# Patient Record
Sex: MALE | Race: BLACK OR AFRICAN AMERICAN | NOT HISPANIC OR LATINO | Employment: PART TIME | URBAN - METROPOLITAN AREA
[De-identification: names, ages, dates, MRNs, and addresses within clinical notes are randomized per-mention and may not be internally consistent; named-entity substitution may affect disease eponyms.]

---
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
## 2021-02-20 ENCOUNTER — HOSPITAL ENCOUNTER (EMERGENCY)
Facility: HOSPITAL | Age: 21
Discharge: HOME | End: 2021-02-20
Attending: INTERNAL MEDICINE
Payer: MEDICAID

## 2021-02-20 VITALS
SYSTOLIC BLOOD PRESSURE: 139 MMHG | OXYGEN SATURATION: 100 % | RESPIRATION RATE: 18 BRPM | WEIGHT: 178.19 LBS | BODY MASS INDEX: 22.87 KG/M2 | DIASTOLIC BLOOD PRESSURE: 74 MMHG | HEART RATE: 92 BPM | HEIGHT: 74 IN | TEMPERATURE: 99 F

## 2021-02-20 DIAGNOSIS — J06.9 VIRAL URI WITH COUGH: ICD-10-CM

## 2021-02-20 DIAGNOSIS — J20.9 ACUTE BRONCHITIS, UNSPECIFIED ORGANISM: Primary | ICD-10-CM

## 2021-02-20 DIAGNOSIS — Z77.22 EXPOSURE TO SECOND HAND SMOKE: ICD-10-CM

## 2021-02-20 LAB
CTP QC/QA: YES
SARS-COV-2 RDRP RESP QL NAA+PROBE: NEGATIVE

## 2021-02-20 PROCEDURE — U0002 COVID-19 LAB TEST NON-CDC: HCPCS | Mod: ER | Performed by: NURSE PRACTITIONER

## 2021-02-20 PROCEDURE — 99284 EMERGENCY DEPT VISIT MOD MDM: CPT | Mod: 25,ER

## 2021-02-20 RX ORDER — FLUTICASONE PROPIONATE 50 MCG
1 SPRAY, SUSPENSION (ML) NASAL 2 TIMES DAILY PRN
Qty: 15 G | Refills: 0 | OUTPATIENT
Start: 2021-02-20 | End: 2021-06-17

## 2021-02-20 RX ORDER — BENZONATATE 100 MG/1
100 CAPSULE ORAL 3 TIMES DAILY PRN
Qty: 30 CAPSULE | Refills: 0 | Status: SHIPPED | OUTPATIENT
Start: 2021-02-20 | End: 2021-03-02

## 2021-02-20 RX ORDER — ALBUTEROL SULFATE 5 MG/ML
2.5 SOLUTION RESPIRATORY (INHALATION)
COMMUNITY
End: 2021-11-16 | Stop reason: SDUPTHER

## 2021-02-20 RX ORDER — PROMETHAZINE HYDROCHLORIDE AND DEXTROMETHORPHAN HYDROBROMIDE 6.25; 15 MG/5ML; MG/5ML
5 SYRUP ORAL NIGHTLY PRN
Qty: 180 ML | Refills: 0 | Status: SHIPPED | OUTPATIENT
Start: 2021-02-20 | End: 2021-03-02

## 2021-02-20 RX ORDER — AZITHROMYCIN 250 MG/1
250 TABLET, FILM COATED ORAL DAILY
Qty: 6 TABLET | Refills: 0 | OUTPATIENT
Start: 2021-02-20 | End: 2021-06-17

## 2021-02-20 RX ORDER — CETIRIZINE HYDROCHLORIDE 10 MG/1
10 TABLET ORAL DAILY
Qty: 30 TABLET | Refills: 0 | OUTPATIENT
Start: 2021-02-20 | End: 2021-06-17

## 2021-06-17 ENCOUNTER — HOSPITAL ENCOUNTER (EMERGENCY)
Facility: HOSPITAL | Age: 21
Discharge: HOME | End: 2021-06-17
Attending: EMERGENCY MEDICINE
Payer: MEDICAID

## 2021-06-17 VITALS
WEIGHT: 185 LBS | DIASTOLIC BLOOD PRESSURE: 70 MMHG | OXYGEN SATURATION: 98 % | HEIGHT: 74 IN | SYSTOLIC BLOOD PRESSURE: 124 MMHG | TEMPERATURE: 99 F | RESPIRATION RATE: 18 BRPM | BODY MASS INDEX: 23.74 KG/M2 | HEART RATE: 89 BPM

## 2021-06-17 DIAGNOSIS — H66.91 RIGHT OTITIS MEDIA, UNSPECIFIED OTITIS MEDIA TYPE: Primary | ICD-10-CM

## 2021-06-17 DIAGNOSIS — J06.9 VIRAL URI WITH COUGH: ICD-10-CM

## 2021-06-17 DIAGNOSIS — J30.9 ALLERGIC RHINITIS, UNSPECIFIED SEASONALITY, UNSPECIFIED TRIGGER: ICD-10-CM

## 2021-06-17 LAB
CTP QC/QA: YES
INFLUENZA A ANTIGEN, POC: NEGATIVE
INFLUENZA B ANTIGEN, POC: NEGATIVE
SARS-COV-2 RDRP RESP QL NAA+PROBE: NEGATIVE

## 2021-06-17 PROCEDURE — 25000003 PHARM REV CODE 250: Mod: ER | Performed by: NURSE PRACTITIONER

## 2021-06-17 PROCEDURE — U0002 COVID-19 LAB TEST NON-CDC: HCPCS | Mod: ER | Performed by: NURSE PRACTITIONER

## 2021-06-17 PROCEDURE — 87804 INFLUENZA ASSAY W/OPTIC: CPT | Mod: 59,ER

## 2021-06-17 PROCEDURE — 99284 EMERGENCY DEPT VISIT MOD MDM: CPT | Mod: 25,ER

## 2021-06-17 RX ORDER — DEXTROMETHORPHAN HYDROBROMIDE, GUAIFENESIN 5; 100 MG/5ML; MG/5ML
650 LIQUID ORAL EVERY 8 HOURS
Qty: 20 TABLET | Refills: 0 | Status: SHIPPED | OUTPATIENT
Start: 2021-06-17 | End: 2021-07-17

## 2021-06-17 RX ORDER — IBUPROFEN 600 MG/1
600 TABLET ORAL EVERY 6 HOURS PRN
Qty: 20 TABLET | Refills: 0 | Status: SHIPPED | OUTPATIENT
Start: 2021-06-17 | End: 2021-07-17

## 2021-06-17 RX ORDER — ONDANSETRON 4 MG/1
4 TABLET, ORALLY DISINTEGRATING ORAL
Status: COMPLETED | OUTPATIENT
Start: 2021-06-17 | End: 2021-06-17

## 2021-06-17 RX ORDER — AMOXICILLIN AND CLAVULANATE POTASSIUM 875; 125 MG/1; MG/1
1 TABLET, FILM COATED ORAL 2 TIMES DAILY
Qty: 20 TABLET | Refills: 0 | Status: SHIPPED | OUTPATIENT
Start: 2021-06-17 | End: 2021-06-27

## 2021-06-17 RX ORDER — FLUTICASONE PROPIONATE 50 MCG
1 SPRAY, SUSPENSION (ML) NASAL 2 TIMES DAILY PRN
Qty: 15 G | Refills: 0 | OUTPATIENT
Start: 2021-06-17 | End: 2021-07-17

## 2021-06-17 RX ORDER — IBUPROFEN 600 MG/1
600 TABLET ORAL
Status: COMPLETED | OUTPATIENT
Start: 2021-06-17 | End: 2021-06-17

## 2021-06-17 RX ORDER — CETIRIZINE HYDROCHLORIDE 10 MG/1
10 TABLET ORAL DAILY
Qty: 30 TABLET | Refills: 0 | Status: SHIPPED | OUTPATIENT
Start: 2021-06-17 | End: 2021-07-17

## 2021-06-17 RX ADMIN — IBUPROFEN 600 MG: 600 TABLET ORAL at 06:06

## 2021-06-17 RX ADMIN — ONDANSETRON 4 MG: 4 TABLET, ORALLY DISINTEGRATING ORAL at 06:06

## 2021-07-17 ENCOUNTER — HOSPITAL ENCOUNTER (EMERGENCY)
Facility: HOSPITAL | Age: 21
Discharge: HOME | End: 2021-07-17
Attending: INTERNAL MEDICINE
Payer: MEDICAID

## 2021-07-17 VITALS
SYSTOLIC BLOOD PRESSURE: 120 MMHG | WEIGHT: 180 LBS | OXYGEN SATURATION: 96 % | HEART RATE: 88 BPM | HEIGHT: 74 IN | BODY MASS INDEX: 23.1 KG/M2 | RESPIRATION RATE: 21 BRPM | TEMPERATURE: 98 F | DIASTOLIC BLOOD PRESSURE: 78 MMHG

## 2021-07-17 DIAGNOSIS — J45.31 MILD PERSISTENT ASTHMA WITH EXACERBATION: ICD-10-CM

## 2021-07-17 DIAGNOSIS — R05.9 COUGH: ICD-10-CM

## 2021-07-17 DIAGNOSIS — J06.9 ACUTE URI: Primary | ICD-10-CM

## 2021-07-17 LAB
CTP QC/QA: YES
SARS-COV-2 RDRP RESP QL NAA+PROBE: NEGATIVE

## 2021-07-17 PROCEDURE — 94640 AIRWAY INHALATION TREATMENT: CPT | Mod: ER

## 2021-07-17 PROCEDURE — 99284 EMERGENCY DEPT VISIT MOD MDM: CPT | Mod: 25,ER

## 2021-07-17 PROCEDURE — U0002 COVID-19 LAB TEST NON-CDC: HCPCS | Mod: ER | Performed by: PHYSICIAN ASSISTANT

## 2021-07-17 PROCEDURE — 27100098 HC SPACER: Mod: ER

## 2021-07-17 PROCEDURE — 63600175 PHARM REV CODE 636 W HCPCS: Mod: ER | Performed by: INTERNAL MEDICINE

## 2021-07-17 PROCEDURE — 25000242 PHARM REV CODE 250 ALT 637 W/ HCPCS: Mod: ER | Performed by: INTERNAL MEDICINE

## 2021-07-17 RX ORDER — IBUPROFEN 800 MG/1
800 TABLET ORAL EVERY 8 HOURS PRN
Qty: 30 TABLET | Refills: 0 | OUTPATIENT
Start: 2021-07-17 | End: 2023-05-01

## 2021-07-17 RX ORDER — ALBUTEROL SULFATE 2.5 MG/.5ML
SOLUTION RESPIRATORY (INHALATION)
Status: COMPLETED
Start: 2021-07-17 | End: 2021-07-17

## 2021-07-17 RX ORDER — PREDNISONE 20 MG/1
40 TABLET ORAL DAILY
Qty: 10 TABLET | Refills: 0 | Status: SHIPPED | OUTPATIENT
Start: 2021-07-17 | End: 2021-07-22

## 2021-07-17 RX ORDER — FLUTICASONE PROPIONATE 50 MCG
2 SPRAY, SUSPENSION (ML) NASAL DAILY
Qty: 15 G | Refills: 0 | Status: SHIPPED | OUTPATIENT
Start: 2021-07-17 | End: 2022-03-27 | Stop reason: SDUPTHER

## 2021-07-17 RX ORDER — AZELASTINE 1 MG/ML
2 SPRAY, METERED NASAL 2 TIMES DAILY
Qty: 30 ML | Refills: 0 | OUTPATIENT
Start: 2021-07-17 | End: 2023-05-01

## 2021-07-17 RX ORDER — PREDNISONE 20 MG/1
60 TABLET ORAL
Status: COMPLETED | OUTPATIENT
Start: 2021-07-17 | End: 2021-07-17

## 2021-07-17 RX ORDER — LEVETIRACETAM 750 MG/1
750 TABLET, EXTENDED RELEASE ORAL 2 TIMES DAILY
COMMUNITY
Start: 2021-06-16 | End: 2023-09-11 | Stop reason: SDUPTHER

## 2021-07-17 RX ORDER — FLUOXETINE HYDROCHLORIDE 40 MG/1
40 CAPSULE ORAL DAILY
COMMUNITY
Start: 2021-06-17

## 2021-07-17 RX ORDER — ALBUTEROL SULFATE 90 UG/1
2 AEROSOL, METERED RESPIRATORY (INHALATION) EVERY 6 HOURS PRN
Status: DISCONTINUED | OUTPATIENT
Start: 2021-07-18 | End: 2021-07-18 | Stop reason: HOSPADM

## 2021-07-17 RX ADMIN — PREDNISONE 60 MG: 20 TABLET ORAL at 11:07

## 2021-07-17 RX ADMIN — ALBUTEROL SULFATE 2 PUFF: 90 AEROSOL, METERED RESPIRATORY (INHALATION) at 11:07

## 2021-09-25 ENCOUNTER — HOSPITAL ENCOUNTER (EMERGENCY)
Facility: HOSPITAL | Age: 21
Discharge: HOME | End: 2021-09-25
Attending: EMERGENCY MEDICINE
Payer: MEDICAID

## 2021-09-25 VITALS
DIASTOLIC BLOOD PRESSURE: 82 MMHG | HEIGHT: 74 IN | OXYGEN SATURATION: 100 % | WEIGHT: 192 LBS | TEMPERATURE: 98 F | RESPIRATION RATE: 16 BRPM | HEART RATE: 70 BPM | BODY MASS INDEX: 24.64 KG/M2 | SYSTOLIC BLOOD PRESSURE: 134 MMHG

## 2021-09-25 DIAGNOSIS — R06.2 WHEEZING: ICD-10-CM

## 2021-09-25 DIAGNOSIS — R09.81 SINUS CONGESTION: Primary | ICD-10-CM

## 2021-09-25 DIAGNOSIS — J45.21 MILD INTERMITTENT ASTHMATIC BRONCHITIS WITH ACUTE EXACERBATION: ICD-10-CM

## 2021-09-25 LAB
CTP QC/QA: YES
POC RAPID STREP A: NEGATIVE
SARS-COV-2 RDRP RESP QL NAA+PROBE: NEGATIVE

## 2021-09-25 PROCEDURE — 25000242 PHARM REV CODE 250 ALT 637 W/ HCPCS: Mod: ER | Performed by: EMERGENCY MEDICINE

## 2021-09-25 PROCEDURE — 63600175 PHARM REV CODE 636 W HCPCS: Mod: ER | Performed by: EMERGENCY MEDICINE

## 2021-09-25 PROCEDURE — 94640 AIRWAY INHALATION TREATMENT: CPT | Mod: ER

## 2021-09-25 PROCEDURE — U0002 COVID-19 LAB TEST NON-CDC: HCPCS | Mod: ER | Performed by: EMERGENCY MEDICINE

## 2021-09-25 PROCEDURE — 99285 EMERGENCY DEPT VISIT HI MDM: CPT | Mod: 25,ER

## 2021-09-25 RX ORDER — PREDNISONE 20 MG/1
40 TABLET ORAL DAILY
Qty: 10 TABLET | Refills: 0 | Status: SHIPPED | OUTPATIENT
Start: 2021-09-25 | End: 2021-09-30

## 2021-09-25 RX ORDER — ALBUTEROL SULFATE 90 UG/1
1-2 AEROSOL, METERED RESPIRATORY (INHALATION) EVERY 6 HOURS PRN
Qty: 8 G | Refills: 2 | Status: SHIPPED | OUTPATIENT
Start: 2021-09-25 | End: 2021-11-16 | Stop reason: SDUPTHER

## 2021-09-25 RX ORDER — IPRATROPIUM BROMIDE AND ALBUTEROL SULFATE 2.5; .5 MG/3ML; MG/3ML
3 SOLUTION RESPIRATORY (INHALATION)
Status: COMPLETED | OUTPATIENT
Start: 2021-09-25 | End: 2021-09-25

## 2021-09-25 RX ORDER — PREDNISONE 20 MG/1
60 TABLET ORAL
Status: COMPLETED | OUTPATIENT
Start: 2021-09-25 | End: 2021-09-25

## 2021-09-25 RX ORDER — GUAIFENESIN AND PSEUDOEPHEDRINE HCL 1200; 120 MG/1; MG/1
1 TABLET, EXTENDED RELEASE ORAL 2 TIMES DAILY
Qty: 10 EACH | Refills: 1 | Status: SHIPPED | OUTPATIENT
Start: 2021-09-25 | End: 2021-09-30

## 2021-09-25 RX ADMIN — IPRATROPIUM BROMIDE AND ALBUTEROL SULFATE 3 ML: .5; 3 SOLUTION RESPIRATORY (INHALATION) at 08:09

## 2021-09-25 RX ADMIN — PREDNISONE 60 MG: 20 TABLET ORAL at 08:09

## 2021-11-16 ENCOUNTER — OFFICE VISIT (OUTPATIENT)
Dept: URGENT CARE | Facility: CLINIC | Age: 21
End: 2021-11-16
Payer: MEDICAID

## 2021-11-16 VITALS
HEART RATE: 89 BPM | HEIGHT: 74 IN | DIASTOLIC BLOOD PRESSURE: 83 MMHG | BODY MASS INDEX: 24.38 KG/M2 | WEIGHT: 190 LBS | RESPIRATION RATE: 20 BRPM | SYSTOLIC BLOOD PRESSURE: 126 MMHG | TEMPERATURE: 98 F | OXYGEN SATURATION: 93 %

## 2021-11-16 DIAGNOSIS — R09.81 NASAL CONGESTION: Primary | ICD-10-CM

## 2021-11-16 DIAGNOSIS — J45.901 MODERATE ASTHMA WITH EXACERBATION, UNSPECIFIED WHETHER PERSISTENT: ICD-10-CM

## 2021-11-16 LAB
CTP QC/QA: YES
SARS-COV-2 RDRP RESP QL NAA+PROBE: NEGATIVE

## 2021-11-16 PROCEDURE — 71046 XR CHEST PA AND LATERAL: ICD-10-PCS | Mod: S$GLB,,, | Performed by: RADIOLOGY

## 2021-11-16 PROCEDURE — U0002 COVID-19 LAB TEST NON-CDC: HCPCS | Mod: QW,S$GLB,, | Performed by: NURSE PRACTITIONER

## 2021-11-16 PROCEDURE — 94640 AIRWAY INHALATION TREATMENT: CPT | Mod: 59,S$GLB,, | Performed by: NURSE PRACTITIONER

## 2021-11-16 PROCEDURE — 99204 PR OFFICE/OUTPT VISIT, NEW, LEVL IV, 45-59 MIN: ICD-10-PCS | Mod: 25,S$GLB,, | Performed by: NURSE PRACTITIONER

## 2021-11-16 PROCEDURE — 71046 X-RAY EXAM CHEST 2 VIEWS: CPT | Mod: S$GLB,,, | Performed by: RADIOLOGY

## 2021-11-16 PROCEDURE — 99204 OFFICE O/P NEW MOD 45 MIN: CPT | Mod: 25,S$GLB,, | Performed by: NURSE PRACTITIONER

## 2021-11-16 PROCEDURE — U0002: ICD-10-PCS | Mod: QW,S$GLB,, | Performed by: NURSE PRACTITIONER

## 2021-11-16 PROCEDURE — 94640 PR INHAL RX, AIRWAY OBST/DX SPUTUM INDUCT: ICD-10-PCS | Mod: S$GLB,,, | Performed by: NURSE PRACTITIONER

## 2021-11-16 RX ORDER — ALBUTEROL SULFATE 5 MG/ML
2.5 SOLUTION RESPIRATORY (INHALATION) EVERY 6 HOURS PRN
Qty: 20 ML | Refills: 1 | Status: SHIPPED | OUTPATIENT
Start: 2021-11-16 | End: 2022-01-31 | Stop reason: SDUPTHER

## 2021-11-16 RX ORDER — CETIRIZINE HYDROCHLORIDE 10 MG/1
10 TABLET ORAL DAILY
Qty: 30 TABLET | Refills: 0 | OUTPATIENT
Start: 2021-11-16 | End: 2023-05-01

## 2021-11-16 RX ORDER — ALBUTEROL SULFATE 0.83 MG/ML
2.5 SOLUTION RESPIRATORY (INHALATION)
Status: COMPLETED | OUTPATIENT
Start: 2021-11-16 | End: 2021-11-16

## 2021-11-16 RX ORDER — IPRATROPIUM BROMIDE 0.5 MG/2.5ML
0.5 SOLUTION RESPIRATORY (INHALATION)
Status: COMPLETED | OUTPATIENT
Start: 2021-11-16 | End: 2021-11-16

## 2021-11-16 RX ORDER — FLUTICASONE PROPIONATE 50 MCG
1 SPRAY, SUSPENSION (ML) NASAL 2 TIMES DAILY
Qty: 9.9 ML | Refills: 0 | OUTPATIENT
Start: 2021-11-16 | End: 2023-05-01

## 2021-11-16 RX ORDER — ALBUTEROL SULFATE 90 UG/1
1-2 AEROSOL, METERED RESPIRATORY (INHALATION) EVERY 6 HOURS PRN
Qty: 8 G | Refills: 2 | Status: SHIPPED | OUTPATIENT
Start: 2021-11-16 | End: 2021-12-16

## 2021-11-16 RX ADMIN — IPRATROPIUM BROMIDE 0.5 MG: 0.5 SOLUTION RESPIRATORY (INHALATION) at 12:11

## 2021-11-16 RX ADMIN — ALBUTEROL SULFATE 2.5 MG: 0.83 SOLUTION RESPIRATORY (INHALATION) at 12:11

## 2022-01-12 ENCOUNTER — HOSPITAL ENCOUNTER (EMERGENCY)
Facility: HOSPITAL | Age: 22
Discharge: HOME | End: 2022-01-12
Attending: EMERGENCY MEDICINE
Payer: MEDICAID

## 2022-01-12 VITALS
SYSTOLIC BLOOD PRESSURE: 123 MMHG | OXYGEN SATURATION: 100 % | HEIGHT: 74 IN | HEART RATE: 116 BPM | BODY MASS INDEX: 23.1 KG/M2 | DIASTOLIC BLOOD PRESSURE: 84 MMHG | RESPIRATION RATE: 18 BRPM | TEMPERATURE: 100 F | WEIGHT: 180 LBS

## 2022-01-12 DIAGNOSIS — J45.20 MILD INTERMITTENT ASTHMA, UNSPECIFIED WHETHER COMPLICATED: ICD-10-CM

## 2022-01-12 DIAGNOSIS — U07.1 COVID-19: Primary | ICD-10-CM

## 2022-01-12 LAB
CTP QC/QA: YES
SARS-COV-2 RDRP RESP QL NAA+PROBE: POSITIVE

## 2022-01-12 PROCEDURE — 63600175 PHARM REV CODE 636 W HCPCS: Mod: ER | Performed by: EMERGENCY MEDICINE

## 2022-01-12 PROCEDURE — 99284 EMERGENCY DEPT VISIT MOD MDM: CPT | Mod: 25,ER

## 2022-01-12 PROCEDURE — 25000242 PHARM REV CODE 250 ALT 637 W/ HCPCS: Mod: ER | Performed by: EMERGENCY MEDICINE

## 2022-01-12 PROCEDURE — U0002 COVID-19 LAB TEST NON-CDC: HCPCS | Mod: ER | Performed by: EMERGENCY MEDICINE

## 2022-01-12 RX ORDER — IPRATROPIUM BROMIDE AND ALBUTEROL SULFATE 2.5; .5 MG/3ML; MG/3ML
3 SOLUTION RESPIRATORY (INHALATION)
Status: COMPLETED | OUTPATIENT
Start: 2022-01-12 | End: 2022-01-12

## 2022-01-12 RX ORDER — ALBUTEROL SULFATE 2.5 MG/.5ML
2.5 SOLUTION RESPIRATORY (INHALATION) EVERY 4 HOURS PRN
Qty: 30 EACH | Refills: 0 | OUTPATIENT
Start: 2022-01-12 | End: 2022-01-31

## 2022-01-12 RX ORDER — PREDNISONE 10 MG/1
10 TABLET ORAL DAILY
Qty: 5 TABLET | Refills: 0 | Status: SHIPPED | OUTPATIENT
Start: 2022-01-12 | End: 2022-01-17

## 2022-01-12 RX ORDER — PREDNISONE 20 MG/1
40 TABLET ORAL
Status: COMPLETED | OUTPATIENT
Start: 2022-01-12 | End: 2022-01-12

## 2022-01-12 RX ORDER — METOCLOPRAMIDE 10 MG/1
10 TABLET ORAL EVERY 6 HOURS PRN
Qty: 30 TABLET | Refills: 0 | OUTPATIENT
Start: 2022-01-12 | End: 2023-05-01

## 2022-01-12 RX ORDER — ALBUTEROL SULFATE 90 UG/1
AEROSOL, METERED RESPIRATORY (INHALATION)
Status: DISCONTINUED
Start: 2022-01-12 | End: 2022-01-12 | Stop reason: HOSPADM

## 2022-01-12 RX ADMIN — PREDNISONE 40 MG: 20 TABLET ORAL at 09:01

## 2022-01-12 RX ADMIN — IPRATROPIUM BROMIDE AND ALBUTEROL SULFATE 3 ML: .5; 3 SOLUTION RESPIRATORY (INHALATION) at 09:01

## 2022-01-13 NOTE — ED PROVIDER NOTES
Encounter Date: 1/12/2022    SCRIBE #1 NOTE: I, Sharona Simpson, am scribing for, and in the presence of,  Jace Carranza MD. I have scribed the following portions of the note - Other sections scribed: HPI, ROS, PE.       History     Chief Complaint   Patient presents with    COVID-19 Concerns     Complains of headache, SOB, nausea, vomiting, and fatigue since this morning.      Raymond Duarte is a 21 y.o. male with Hx of Asthma who presents to the ED for evaluation of acute shortness of breath, abdominal pain, nausea, vomiting, and dizziness since this morning. Denies any other associated symptoms. No exacerbating or alleviating factors stated. No other complaints at this time.    The history is provided by the patient. No  was used.     Review of patient's allergies indicates:  No Known Allergies  Past Medical History:   Diagnosis Date    Asthma     Seizures      No past surgical history on file.  No family history on file.  Social History     Tobacco Use    Smoking status: Never Smoker    Smokeless tobacco: Never Used   Substance Use Topics    Alcohol use: Never    Drug use: Never     Review of Systems   Constitutional: Negative.  Negative for fever.   HENT: Negative.  Negative for sore throat.    Eyes: Negative.    Respiratory: Positive for shortness of breath.    Cardiovascular: Negative.  Negative for chest pain.   Gastrointestinal: Positive for abdominal pain, nausea and vomiting.   Endocrine: Negative.    Genitourinary: Negative.  Negative for dysuria.   Musculoskeletal: Negative.  Negative for myalgias.   Skin: Negative for rash.   Allergic/Immunologic: Negative.    Neurological: Positive for dizziness. Negative for headaches.   Hematological: Negative.  Negative for adenopathy.   Psychiatric/Behavioral: Negative.  Negative for behavioral problems.   All other systems reviewed and are negative.      Physical Exam     Initial Vitals [01/12/22 1756]   BP Pulse Resp Temp SpO2    (!) 137/51 110 20 99.9 °F (37.7 °C) 97 %      MAP       --         Physical Exam    Nursing note and vitals reviewed.  Constitutional: He appears well-developed and well-nourished.   HENT:   Head: Normocephalic and atraumatic.   Eyes: Conjunctivae and EOM are normal.   Neck: Neck supple.   Normal range of motion.  Cardiovascular: Normal rate and intact distal pulses.   Pulmonary/Chest: Effort normal. No respiratory distress. He has wheezes in the right upper field, the right middle field, the right lower field, the left upper field, the left middle field and the left lower field.   Abdominal: Abdomen is soft. There is no abdominal tenderness.   Musculoskeletal:         General: Normal range of motion.      Cervical back: Normal range of motion and neck supple.     Neurological: He is alert and oriented to person, place, and time.   Skin: Skin is warm and dry.   Psychiatric: He has a normal mood and affect. His behavior is normal.         ED Course   Procedures  Labs Reviewed   SARS-COV-2 RDRP GENE - Abnormal; Notable for the following components:       Result Value    POC Rapid COVID Positive (*)     All other components within normal limits    Narrative:     This test utilizes isothermal nucleic acid amplification   technology to detect the SARS-CoV-2 RdRp nucleic acid segment.   The analytical sensitivity (limit of detection) is 125 genome   equivalents/mL.   A POSITIVE result implies infection with the SARS-CoV-2 virus;   the patient is presumed to be contagious.     A NEGATIVE result means that SARS-CoV-2 nucleic acids are not   present above the limit of detection. A NEGATIVE result should be   treated as presumptive. It does not rule out the possibility of   COVID-19 and should not be the sole basis for treatment decisions.   If COVID-19 is strongly suspected based on clinical and exposure   history, re-testing using an alternate molecular assay should be   considered.   This test is only for use under the  Food and Drug   Administration s Emergency Use Authorization (EUA).   Commercial kits are provided by [employer].   Performance characteristics of the EUA have been independently   verified by [facility] Department of   Pathology and Laboratory Medicine.   _________________________________________________________________   The authorized Fact Sheet for Healthcare Providers and the authorized Fact   Sheet for Patients of the ID NOW COVID-19 are available on the FDA   website:     https://www.fda.gov/media/615282/download  https://www.fda.gov/media/877052/download                Imaging Results    None          Medications   albuterol-ipratropium 2.5 mg-0.5 mg/3 mL nebulizer solution 3 mL (3 mLs Nebulization Given 1/12/22 2105)   predniSONE tablet 40 mg (40 mg Oral Given 1/12/22 2115)     Medical Decision Making:   History:   Old Medical Records: I decided to obtain old medical records.  ED Management:  Markedly improved with steroid therapy and nebulizer treatment.          Scribe Attestation:   Scribe #1: I performed the above scribed service and the documentation accurately describes the services I performed. I attest to the accuracy of the note.               This document was produced by a scribe under my direction and in my presence. I agree with the content of the note and have made any necessary edits.     Jace Carranza MD    01/13/2022 1:23 AM    Clinical Impression:   Final diagnoses:  [U07.1] COVID-19 (Primary)  [J45.20] Mild intermittent asthma, unspecified whether complicated          ED Disposition Condition    Discharge Stable        ED Prescriptions     Medication Sig Dispense Start Date End Date Auth. Provider    albuterol sulfate 2.5 mg/0.5 mL Nebu Take 2.5 mg by nebulization every 4 (four) hours as needed. Rescue 30 each 1/12/2022 1/12/2023 Jace Carranza MD    predniSONE (DELTASONE) 10 MG tablet Take 1 tablet (10 mg total) by mouth once daily. for 5 days 5 tablet  1/12/2022 1/17/2022 Jace Carranza MD    metoclopramide HCl (REGLAN) 10 MG tablet Take 1 tablet (10 mg total) by mouth every 6 (six) hours as needed. 30 tablet 1/12/2022  Jace Carranza MD        Follow-up Information     Follow up With Specialties Details Why Contact Info    Your PCP  Schedule an appointment as soon as possible for a visit in 1 week As needed            Jace Carranza MD  01/13/22 0124

## 2022-01-31 ENCOUNTER — HOSPITAL ENCOUNTER (EMERGENCY)
Facility: HOSPITAL | Age: 22
Discharge: HOME | End: 2022-01-31
Attending: EMERGENCY MEDICINE
Payer: MEDICAID

## 2022-01-31 VITALS
RESPIRATION RATE: 18 BRPM | TEMPERATURE: 99 F | HEIGHT: 74 IN | WEIGHT: 180 LBS | SYSTOLIC BLOOD PRESSURE: 130 MMHG | BODY MASS INDEX: 23.1 KG/M2 | DIASTOLIC BLOOD PRESSURE: 81 MMHG | OXYGEN SATURATION: 98 % | HEART RATE: 87 BPM

## 2022-01-31 DIAGNOSIS — J45.901 MODERATE ASTHMA WITH EXACERBATION, UNSPECIFIED WHETHER PERSISTENT: ICD-10-CM

## 2022-01-31 DIAGNOSIS — J45.901 EXACERBATION OF ASTHMA, UNSPECIFIED ASTHMA SEVERITY, UNSPECIFIED WHETHER PERSISTENT: Primary | ICD-10-CM

## 2022-01-31 PROCEDURE — 94644 CONT INHLJ TX 1ST HOUR: CPT | Mod: ER

## 2022-01-31 PROCEDURE — 99284 EMERGENCY DEPT VISIT MOD MDM: CPT | Mod: 25,ER

## 2022-01-31 PROCEDURE — 25000242 PHARM REV CODE 250 ALT 637 W/ HCPCS: Mod: ER | Performed by: EMERGENCY MEDICINE

## 2022-01-31 PROCEDURE — 63600175 PHARM REV CODE 636 W HCPCS: Mod: ER | Performed by: EMERGENCY MEDICINE

## 2022-01-31 PROCEDURE — 96372 THER/PROPH/DIAG INJ SC/IM: CPT | Mod: ER

## 2022-01-31 RX ORDER — ALBUTEROL SULFATE 5 MG/ML
2.5 SOLUTION RESPIRATORY (INHALATION) EVERY 4 HOURS PRN
Qty: 20 ML | Refills: 3 | Status: SHIPPED | OUTPATIENT
Start: 2022-01-31 | End: 2022-03-27 | Stop reason: SDUPTHER

## 2022-01-31 RX ORDER — ALBUTEROL SULFATE 90 UG/1
1-2 AEROSOL, METERED RESPIRATORY (INHALATION) EVERY 6 HOURS PRN
Qty: 18 G | Refills: 2 | Status: SHIPPED | OUTPATIENT
Start: 2022-01-31 | End: 2022-03-27 | Stop reason: SDUPTHER

## 2022-01-31 RX ORDER — IPRATROPIUM BROMIDE AND ALBUTEROL SULFATE 2.5; .5 MG/3ML; MG/3ML
9 SOLUTION RESPIRATORY (INHALATION) CONTINUOUS
Status: DISPENSED | OUTPATIENT
Start: 2022-01-31 | End: 2022-01-31

## 2022-01-31 RX ORDER — PREDNISONE 20 MG/1
40 TABLET ORAL DAILY
Qty: 10 TABLET | Refills: 0 | Status: SHIPPED | OUTPATIENT
Start: 2022-01-31 | End: 2022-02-05

## 2022-01-31 RX ORDER — DEXAMETHASONE SODIUM PHOSPHATE 4 MG/ML
6 INJECTION, SOLUTION INTRA-ARTICULAR; INTRALESIONAL; INTRAMUSCULAR; INTRAVENOUS; SOFT TISSUE
Status: COMPLETED | OUTPATIENT
Start: 2022-01-31 | End: 2022-01-31

## 2022-01-31 RX ADMIN — DEXAMETHASONE SODIUM PHOSPHATE 6 MG: 4 INJECTION INTRA-ARTICULAR; INTRALESIONAL; INTRAMUSCULAR; INTRAVENOUS; SOFT TISSUE at 07:01

## 2022-01-31 RX ADMIN — IPRATROPIUM BROMIDE AND ALBUTEROL SULFATE 9 ML: .5; 3 SOLUTION RESPIRATORY (INHALATION) at 07:01

## 2022-01-31 NOTE — Clinical Note
"Raymond"Eric Duarte was seen and treated in our emergency department on 1/31/2022.  He may return to work on 02/01/2022.       If you have any questions or concerns, please don't hesitate to call.      Tomasz Alberto MD"

## 2022-01-31 NOTE — ED PROVIDER NOTES
"EM PHYSICIAN NOTE    HPI  This patient presents with a complaint of   Chief Complaint   Patient presents with    Asthma     Pt reports SOB and cough x2 weeks after covid diagnosis, pt states he is out of his home asthma a meds, reports temporary relief with home meds.        HPI:  This is a 21-year-old gentleman with a history of asthma who was diagnosed with COVID 2 weeks ago.  He presents the emergency department with a complaint of increasing wheezing and cough.  He ran out of his MDI and nebulizers and reports that when he had them he was only having temporary relief of his symptoms.  He has generalized fatigue but no fevers.  No vomiting.    REVIEW of PMH, SOC History and Family History:  Past Medical History:   Diagnosis Date    Asthma     Seizures      Social history noncontributory  Family history noncontributory  Review of patient's allergies indicates:  No Known Allergies        REVIEW of SYSTEMS  Source:  Patient  The nurse's notes and triage vital signs were reviewed.  GENERAL/CONSTITUTIONAL: There is no report of fever, weakness, or unexplained weight loss.  CARDIOVASCULAR: There is no report of chest pain   RESPIRATORY:  See HPI  GASTROINTESTINAL: There is no report of nausea, vomiting, diarrhea  MUSCULOSKELETAL: There is no report of joint or muscle pain. No muscle weakness or tenderness.  SKIN AND BREASTS: There is no report of easy bruising, skin redness, skin rash.  HEMATOLOGIC/LYMPHATIC: There is no report of anemia, bleeding or clotting defects. There is no report of anticoagulant use.  The remainder of the ROS is negative.        PHYSICAL EXAMINATION    ED Triage Vitals [01/31/22 0710]   Enc Vitals Group      /81      Pulse 94      Resp 16      Temp 98.5 °F (36.9 °C)      Temp src Oral      SpO2 95 %      Weight 180 lb      Height 6' 2"      Head Circumference       Peak Flow       Pain Score       Pain Loc       Pain Edu?       Excl. in GC?      Vital signs and Pulse Ox reviewed in " clinical context. Abnormalities noted: none:  Heart rate, blood pressure, temperature, respiratory rate and pulse ox are all within normal limits for age  Pt's level of consciousness is alert, and the patient is in mild distress.  Skin: warm, pink and dry.  Capillary refill is less than 2 seconds.  Mucosa:moist  Head and Neck:  Neck is supple, no stridor.  Normal voice  Cardiac exam: RRR no murmur  Pulmonary exam:  Tight, end-expiratory wheezing, prolonged expiratory phase  Abd Exam: soft nontender   Musculoskeletal: no joint tenderness, deformity or swelling   Neurologic: GCS: GCS 15; 5 over 5 strength, cranial nerves intact, neck supple       Initial Impression:  Bronchospasm, recent COVID  Plan:  Decadron, DuoNebs, reassess  Tomasz Alberto MD, 7:21 AM 1/31/2022      Medical decision making:   Nurses notes and Vital Signs reviewed.  No orders of the defined types were placed in this encounter.      ED Course as of 01/31/22 0846   Mon Jan 31, 2022   0835 Patient reports he is feeling better.  On my exam there was no longer wheezing or prolonged expiratory phase. [MH]      ED Course User Index  [MH] Tomasz Alberto MD       Diagnoses that have been ruled out:   None   Diagnoses that are still under consideration:   None   Final diagnoses:   Exacerbation of asthma, unspecified asthma severity, unspecified whether persistent            Follow-up Information     Follow up With Specialties Details Why Contact Info    Cheryl Dubon NP Family Medicine Schedule an appointment as soon as possible for a visit in 1 week Primary Care Provider: Blood Pressure check ASAP, Call to schedule an appointment [address] [facility] [address] [phone]          ED Prescriptions     Medication Sig Dispense Start Date End Date Auth. Provider    albuterol (PROVENTIL) 5 mg/mL nebulizer solution Take 0.5 mLs (2.5 mg total) by nebulization every 4 (four) hours as needed for Wheezing. 20 mL 1/31/2022 5/1/2022 Tomasz QUESADA  MD Dolly    albuterol (PROVENTIL/VENTOLIN HFA) 90 mcg/actuation inhaler Inhale 1-2 puffs into the lungs every 6 (six) hours as needed for Wheezing. Rescue 18 g 1/31/2022 5/1/2022 Tomasz Alberto MD    predniSONE (DELTASONE) 20 MG tablet Take 2 tablets (40 mg total) by mouth once daily. for 5 days 10 tablet 1/31/2022 2/5/2022 Tomasz Alberto MD          This note was created using [employer].  This program may occasionally misinterpret certain words and phrases.        ED Disposition Condition    Discharge Stable                        Tomasz Alberto MD  01/31/22 0769       Toamsz Alberto MD  01/31/22 0806

## 2022-03-27 ENCOUNTER — HOSPITAL ENCOUNTER (EMERGENCY)
Facility: HOSPITAL | Age: 22
Discharge: HOME | End: 2022-03-27
Attending: EMERGENCY MEDICINE
Payer: MEDICAID

## 2022-03-27 VITALS
HEIGHT: 74 IN | SYSTOLIC BLOOD PRESSURE: 121 MMHG | BODY MASS INDEX: 23.74 KG/M2 | WEIGHT: 185 LBS | TEMPERATURE: 98 F | DIASTOLIC BLOOD PRESSURE: 77 MMHG | RESPIRATION RATE: 18 BRPM | OXYGEN SATURATION: 99 % | HEART RATE: 88 BPM

## 2022-03-27 DIAGNOSIS — J45.901 MODERATE ASTHMA WITH EXACERBATION, UNSPECIFIED WHETHER PERSISTENT: ICD-10-CM

## 2022-03-27 DIAGNOSIS — J45.21 MILD INTERMITTENT ASTHMA WITH EXACERBATION: Primary | ICD-10-CM

## 2022-03-27 LAB
CTP QC/QA: YES
SARS-COV-2 RDRP RESP QL NAA+PROBE: NEGATIVE

## 2022-03-27 PROCEDURE — U0002 COVID-19 LAB TEST NON-CDC: HCPCS | Mod: ER | Performed by: EMERGENCY MEDICINE

## 2022-03-27 PROCEDURE — 99285 EMERGENCY DEPT VISIT HI MDM: CPT | Mod: 25,ER

## 2022-03-27 PROCEDURE — 94640 AIRWAY INHALATION TREATMENT: CPT | Mod: ER

## 2022-03-27 PROCEDURE — 25000242 PHARM REV CODE 250 ALT 637 W/ HCPCS: Mod: ER | Performed by: EMERGENCY MEDICINE

## 2022-03-27 PROCEDURE — 63600175 PHARM REV CODE 636 W HCPCS: Mod: ER | Performed by: EMERGENCY MEDICINE

## 2022-03-27 PROCEDURE — 94760 N-INVAS EAR/PLS OXIMETRY 1: CPT | Mod: ER

## 2022-03-27 RX ORDER — ALBUTEROL SULFATE 90 UG/1
1-2 AEROSOL, METERED RESPIRATORY (INHALATION) EVERY 6 HOURS PRN
Qty: 18 G | Refills: 2 | Status: SHIPPED | OUTPATIENT
Start: 2022-03-27 | End: 2022-05-31 | Stop reason: SDUPTHER

## 2022-03-27 RX ORDER — FLUTICASONE PROPIONATE 50 MCG
2 SPRAY, SUSPENSION (ML) NASAL DAILY
Qty: 15 G | Refills: 0 | OUTPATIENT
Start: 2022-03-27 | End: 2023-05-01

## 2022-03-27 RX ORDER — PREDNISONE 20 MG/1
60 TABLET ORAL
Status: COMPLETED | OUTPATIENT
Start: 2022-03-27 | End: 2022-03-27

## 2022-03-27 RX ORDER — ALBUTEROL SULFATE 5 MG/ML
2.5 SOLUTION RESPIRATORY (INHALATION) EVERY 4 HOURS PRN
Qty: 20 ML | Refills: 3 | OUTPATIENT
Start: 2022-03-27 | End: 2022-05-31

## 2022-03-27 RX ORDER — PREDNISONE 20 MG/1
40 TABLET ORAL DAILY
Qty: 8 TABLET | Refills: 0 | Status: SHIPPED | OUTPATIENT
Start: 2022-03-27 | End: 2022-03-31

## 2022-03-27 RX ORDER — IPRATROPIUM BROMIDE AND ALBUTEROL SULFATE 2.5; .5 MG/3ML; MG/3ML
3 SOLUTION RESPIRATORY (INHALATION)
Status: COMPLETED | OUTPATIENT
Start: 2022-03-27 | End: 2022-03-27

## 2022-03-27 RX ADMIN — PREDNISONE 60 MG: 20 TABLET ORAL at 04:03

## 2022-03-27 RX ADMIN — IPRATROPIUM BROMIDE AND ALBUTEROL SULFATE 3 ML: .5; 3 SOLUTION RESPIRATORY (INHALATION) at 04:03

## 2022-03-27 NOTE — DISCHARGE INSTRUCTIONS
Thank you for coming to our Emergency Department today. It is important to remember that some problems are difficult to diagnose and may not be found during your first visit. Be sure to follow up with your primary care doctor and review any labs/imaging that was performed with them. If you do not have a primary care doctor, you may contact the one listed on your discharge paperwork or you may also call the [facility] Appointment Desk at [phone] to schedule an appointment with one.     All medications may potentially have side effects and it is impossible to predict which medications may give you side effects. If you feel that you are having a negative effect of any medication you should immediately stop taking them and seek medical attention.    Return to the ER with any questions/concerns, new/concerning symptoms, worsening or failure to improve. Do not drive or make any important decisions for 24 hours if you have received any pain medications, sedatives or mood altering drugs during your ER visit.

## 2022-03-27 NOTE — Clinical Note
"Raymond"Eric Duarte was seen and treated in our emergency department on 3/27/2022.  He may return to work on 03/30/2022.       If you have any questions or concerns, please don't hesitate to call.      Roger Dubon MD"

## 2022-03-27 NOTE — ED PROVIDER NOTES
Encounter Date: 3/27/2022    SCRIBE #1 NOTE: I, Harini Moseley, am scribing for, and in the presence of,  Roger Dubon MD. I have scribed the following portions of the note - Other sections scribed: HPI; ROS; PE.       History     Chief Complaint   Patient presents with    Asthma     Pt with hx of asthma states he started having trouble breathing around 1pm while at work. Pt has been having productive couple for a couple of days,. Pt reports running out of asthma meds two days ago.      Raymond Duarte is a 21 y.o. male with Hx of asthma and seizures who presents to the ED for chief complaint of asthma exacerbation onset 1 pm today. Patient reports that he has been coughing for the past 2 days. He states that using his albuterol inhaler provides relief. Patient denies recent sick contact exposure. No falls, trips, or trauma. He has not had to be hospitalized for asthma exacerbations in the past. Patient does not endorse tobacco, EtOH, or recreational drug use. He denies fever, rhinorrhea, or congestion. No further complaints at this present time.    The history is provided by the patient. No  was used.     Review of patient's allergies indicates:  No Known Allergies  Past Medical History:   Diagnosis Date    Asthma     Seizures      No past surgical history on file.  No family history on file.  Social History     Tobacco Use    Smoking status: Never Smoker    Smokeless tobacco: Never Used   Substance Use Topics    Alcohol use: Never    Drug use: Never     Review of Systems   Constitutional: Negative for fever.   HENT: Negative for congestion, rhinorrhea and sore throat.    Eyes: Negative for pain and visual disturbance.   Respiratory: Positive for cough and shortness of breath.    Cardiovascular: Negative for chest pain.   Gastrointestinal: Negative for nausea.   Endocrine: Negative for polydipsia and polyuria.   Genitourinary: Negative for dysuria and flank pain.   Musculoskeletal:  Negative for back pain, neck pain and neck stiffness.   Skin: Negative for rash.   Allergic/Immunologic: Negative for immunocompromised state.   Neurological: Negative for dizziness and weakness.   Hematological: Does not bruise/bleed easily.   Psychiatric/Behavioral: Negative for agitation and behavioral problems.   All other systems reviewed and are negative.      Physical Exam     Initial Vitals [03/27/22 1619]   BP Pulse Resp Temp SpO2   130/78 102 19 98.4 °F (36.9 °C) (!) 93 %      MAP       --         Physical Exam    Nursing note and vitals reviewed.  Constitutional: He appears well-developed and well-nourished.   HENT:   Head: Normocephalic and atraumatic.   Mouth/Throat: Oropharynx is clear and moist.   Eyes: EOM are normal. Pupils are equal, round, and reactive to light.   Neck:   Normal range of motion.  Cardiovascular: Normal rate and regular rhythm.   Pulmonary/Chest: No stridor. No respiratory distress. He has wheezes in the right upper field, the right middle field, the right lower field, the left upper field, the left middle field and the left lower field.   At 97% on O2.   Abdominal: Abdomen is soft. Bowel sounds are normal. He exhibits no distension. There is no abdominal tenderness.   Musculoskeletal:         General: No tenderness or edema. Normal range of motion.      Cervical back: Normal range of motion.     Neurological: He is alert and oriented to person, place, and time. He has normal strength. GCS score is 15. GCS eye subscore is 4. GCS verbal subscore is 5. GCS motor subscore is 6.   Skin: Skin is warm and dry. Capillary refill takes less than 2 seconds.   Psychiatric: He has a normal mood and affect. Thought content normal.         ED Course   Procedures  Labs Reviewed   SARS-COV-2 RDRP GENE    Narrative:     This test utilizes isothermal nucleic acid amplification   technology to detect the SARS-CoV-2 RdRp nucleic acid segment.   The analytical sensitivity (limit of detection) is 125  genome   equivalents/mL.   A POSITIVE result implies infection with the SARS-CoV-2 virus;   the patient is presumed to be contagious.     A NEGATIVE result means that SARS-CoV-2 nucleic acids are not   present above the limit of detection. A NEGATIVE result should be   treated as presumptive. It does not rule out the possibility of   COVID-19 and should not be the sole basis for treatment decisions.   If COVID-19 is strongly suspected based on clinical and exposure   history, re-testing using an alternate molecular assay should be   considered.   This test is only for use under the Food and Drug   Administration s Emergency Use Authorization (EUA).   Commercial kits are provided by [employer].   Performance characteristics of the EUA have been independently   verified by [facility] Department of   Pathology and Laboratory Medicine.   _________________________________________________________________   The authorized Fact Sheet for Healthcare Providers and the authorized Fact   Sheet for Patients of the ID NOW COVID-19 are available on the FDA   website:     https://www.fda.gov/media/470872/download  https://www.fda.gov/media/033734/download                 Imaging Results    None          Medications   albuterol-ipratropium 2.5 mg-0.5 mg/3 mL nebulizer solution 3 mL (3 mLs Nebulization Given 3/27/22 1655)   predniSONE tablet 60 mg (60 mg Oral Given 3/27/22 1648)     Medical Decision Making:   History:   Old Medical Records: I decided to obtain old medical records.  Initial Assessment:   21 year old patient presenting with sob and wheezing consistent with an asthma exacerbation. Patient has no altered mental status, improved respiratory status, and no signs of impending ventilator failure. Patient was given 7.5 mg of albuterol and 1.5 mg of ipratropium. Patient has home regimen to control further exacerbations and primary care follow up. Oral steroids. Feels better after treatments. O2 sat 97% and  in no respiratory distress.     Also considered but less likely:   Pneumonia: no fever, unilateral ronchi, or signs concerning of pneumonia  COPD: no history of COPD  ACS: presentation atypical and more consistent with asthma  Pneumothorax: bilateral breath sounds and wheezing bilaterally  CHF: no signs of fluid overload and no history    Return precautions given, patient understands and agrees with plan. All questions answered.  Instructed to follow up with PCP. I discussed with the patient/family the diagnosis, treatment plan, indications for return to the emergency department, and for expected follow-up. The patient/family verbalized an understanding. The patient/family is asked if there are any questions or concerns. We discuss the case, until all issues are addressed to the patient/familys satisfaction. Patient/family understands and is agreeable to the plan.   Roger Dubon        Clinical Tests:   Lab Tests: Ordered and Reviewed          Scribe Attestation:   Scribe #1: I performed the above scribed service and the documentation accurately describes the services I performed. I attest to the accuracy of the note.               I, roger dubon, personally performed the services described in this documentation.  All medical record entries made by the scribe were at my direction and in my presence.  I have reviewed the chart and agree that the record reflects my personal performance and is accurate and complete.  Clinical Impression:   Final diagnoses:  [J45.21] Mild intermittent asthma with exacerbation (Primary)          ED Disposition Condition    Discharge Stable        ED Prescriptions     Medication Sig Dispense Start Date End Date Auth. Provider    albuterol (PROVENTIL) 5 mg/mL nebulizer solution Take 0.5 mLs (2.5 mg total) by nebulization every 4 (four) hours as needed for Wheezing. 20 mL 3/27/2022 6/25/2022 Roger Dubon MD    albuterol (PROVENTIL/VENTOLIN HFA) 90 mcg/actuation inhaler Inhale 1-2 puffs  into the lungs every 6 (six) hours as needed for Wheezing. Rescue 18 g 3/27/2022 6/25/2022 Roger Dubon MD    fluticasone propionate (FLONASE) 50 mcg/actuation nasal spray 2 sprays (100 mcg total) by Each Nostril route once daily. 15 g 3/27/2022  Roger Dubon MD    predniSONE (DELTASONE) 20 MG tablet Take 2 tablets (40 mg total) by mouth once daily. for 4 days 8 tablet 3/27/2022 3/31/2022 Roger Dubon MD        Follow-up Information     Follow up With Specialties Details Why Contact Info    [facility]  Schedule an appointment as soon as possible for a visit in 2 days  [address]  [place] [address]  [phone]             Roger Dubon MD  03/27/22 5573

## 2022-04-18 ENCOUNTER — HOSPITAL ENCOUNTER (EMERGENCY)
Facility: HOSPITAL | Age: 22
Discharge: HOME | End: 2022-04-18
Attending: EMERGENCY MEDICINE
Payer: MEDICAID

## 2022-04-18 VITALS
DIASTOLIC BLOOD PRESSURE: 81 MMHG | HEIGHT: 74 IN | TEMPERATURE: 99 F | OXYGEN SATURATION: 96 % | BODY MASS INDEX: 23.1 KG/M2 | SYSTOLIC BLOOD PRESSURE: 129 MMHG | HEART RATE: 71 BPM | WEIGHT: 180 LBS | RESPIRATION RATE: 16 BRPM

## 2022-04-18 DIAGNOSIS — S61.213A LACERATION OF LEFT MIDDLE FINGER WITHOUT FOREIGN BODY WITHOUT DAMAGE TO NAIL, INITIAL ENCOUNTER: Primary | ICD-10-CM

## 2022-04-18 PROCEDURE — 99283 EMERGENCY DEPT VISIT LOW MDM: CPT | Mod: ER

## 2022-04-18 PROCEDURE — 25000003 PHARM REV CODE 250: Mod: ER | Performed by: NURSE PRACTITIONER

## 2022-04-18 PROCEDURE — 25000003 PHARM REV CODE 250: Mod: ER | Performed by: EMERGENCY MEDICINE

## 2022-04-18 RX ORDER — MUPIROCIN 20 MG/G
OINTMENT TOPICAL 3 TIMES DAILY
Qty: 15 G | Refills: 0 | OUTPATIENT
Start: 2022-04-18 | End: 2023-05-01

## 2022-04-18 RX ORDER — MUPIROCIN 20 MG/G
OINTMENT TOPICAL
Status: COMPLETED | OUTPATIENT
Start: 2022-04-18 | End: 2022-04-18

## 2022-04-18 RX ORDER — ACETAMINOPHEN 325 MG/1
650 TABLET ORAL
Status: COMPLETED | OUTPATIENT
Start: 2022-04-18 | End: 2022-04-18

## 2022-04-18 RX ADMIN — ACETAMINOPHEN 650 MG: 325 TABLET ORAL at 02:04

## 2022-04-18 RX ADMIN — MUPIROCIN: 20 OINTMENT TOPICAL at 05:04

## 2022-04-18 NOTE — FIRST PROVIDER EVALUATION
Emergency Department TeleTriage Encounter Note      CHIEF COMPLAINT    Chief Complaint   Patient presents with    Laceration     Pt reports lac to right 1st and 3rd digit while washing dishes, bleeding controlled with pressure at right 3rd digit.        VITAL SIGNS   Initial Vitals [04/18/22 1449]   BP Pulse Resp Temp SpO2   118/79 82 16 98.5 °F (36.9 °C) 96 %      MAP       --            ALLERGIES    Review of patient's allergies indicates:  No Known Allergies    PROVIDER TRIAGE NOTE  This is a teletriage evaluation of a 22 y.o. male presenting to the ED complaining of lacerations to fingers #1 and #3 sustained while washing dishes yesterday.  Td UTD.     Initial orders will be placed and care will be transferred to an alternate provider when patient is roomed for a full evaluation. Any additional orders and the final disposition will be determined by that provider.           ORDERS  Labs Reviewed - No data to display    ED Orders (720h ago, onward)    Start Ordered     Status Ordering Provider    04/18/22 1500 04/18/22 1456  acetaminophen tablet 650 mg  ED 1 Time         Last MAR action: Given - by SOPHIE BURR on 04/18/22 at 1457 DES NIELSEN            Virtual Visit Note: The provider triage portion of this emergency department evaluation and documentation was performed via [employer], a HIPAA-compliant telemedicine application, in concert with a tele-presenter in the room. A face to face patient evaluation with one of my colleagues will occur once the patient is placed in an emergency department room.      DISCLAIMER: This note was prepared with [employer] voice recognition transcription software. Garbled syntax, mangled pronouns, and other bizarre constructions may be attributed to that software system.

## 2022-04-18 NOTE — ED PROVIDER NOTES
Encounter Date: 4/18/2022    SCRIBE #1 NOTE: I, Ericka Velazquez, am scribing for, and in the presence of,  Clare Shaver NP. I have scribed the following portions of the note - Other sections scribed: HPI, ROS, PE.       History     Chief Complaint   Patient presents with    Laceration     Pt reports lac to right 1st and 3rd digit while washing dishes, bleeding controlled with pressure at right 3rd digit.      Raymond Duarte is a 22 y.o. male with PMHx of asthma and seizures who presents to the ED for evaluation of a lacerations to the right first and third digits. Patient states that he cut these fingers on a broken plate while washing dishes at work at noon yesterday. Patient is concerned as the middle finger continues to bleed today. He endorses cleaning the wounds and applying a topical antibiotic ointment. Patient is right-handed. Denies fever, chils, rash, or any other complaints. No known drug allergies.       The history is provided by the patient. No  was used.     Review of patient's allergies indicates:  No Known Allergies  Past Medical History:   Diagnosis Date    Asthma     Seizures      History reviewed. No pertinent surgical history.  No family history on file.  Social History     Tobacco Use    Smoking status: Never Smoker    Smokeless tobacco: Never Used   Substance Use Topics    Alcohol use: Never    Drug use: Never     Review of Systems   Constitutional: Negative for chills and fever.   HENT: Negative for congestion.    Respiratory: Negative for cough.    Cardiovascular: Negative for chest pain.   Gastrointestinal: Negative for nausea.   Skin: Positive for wound. Negative for rash.   Neurological: Negative for headaches.   All other systems reviewed and are negative.      Physical Exam     Initial Vitals [04/18/22 1449]   BP Pulse Resp Temp SpO2   118/79 82 16 98.5 °F (36.9 °C) 96 %      MAP       --         Physical Exam    Vitals reviewed.  Constitutional: He appears  well-developed and well-nourished. He is not diaphoretic.  Non-toxic appearance. He does not have a sickly appearance. He does not appear ill. No distress.   HENT:   Head: Normocephalic and atraumatic.   Right Ear: External ear normal.   Left Ear: External ear normal.   Neck:   Normal range of motion.  Cardiovascular: Intact distal pulses.   Pulmonary/Chest: No respiratory distress.   Musculoskeletal:         General: Normal range of motion.      Cervical back: Normal range of motion.      Comments: Old well-healing laceration to dorsal left thumb at nail bed.  Full range of motion of the digit against resistance.  Superficial laceration to dorsal aspect of the 3rd finger middle phalanx.  Scant bleeding.  Full range of motion against resistance.  No erythema, warmth.     Neurological: He is alert and oriented to person, place, and time. GCS eye subscore is 4. GCS verbal subscore is 5. GCS motor subscore is 6.   Skin: Skin is warm and dry. Capillary refill takes less than 2 seconds. Laceration noted. No rash noted. No erythema.   Psychiatric: He has a normal mood and affect. Thought content normal.         ED Course   Procedures  Labs Reviewed - No data to display       Imaging Results    None          Medications   acetaminophen tablet 650 mg (650 mg Oral Given 4/18/22 7137)   mupirocin 2 % ointment ( Topical (Top) Given 4/18/22 9134)     Medical Decision Making:   History:   Old Medical Records: I decided to obtain old medical records.  ED Management:  This is an evaluation of a 22 y.o. male that presents to the Emergency Department for a Laceration. Physical Exam shows a non-toxic, afebrile, and well appearing male. There is a laceration to left thumb and left middle finger. There is no surrounding erythema or area of increased warmth. Digit compartments are soft. There is full ROM of digitd against resistance. Equal sensation to the extremity. No visible tendon lacerations observed on exam.  The wound was  irrigated and visually inspected prior to closure. No visible foreign bodies noted. Vital Signs Are Reassuring. Tetanus is up to date.     Wounds are older than 24 hours.  Wound care provided.  Will treat with topical mupirocin.    My overall impression is Laceration. I considered, but at this time, do not suspect cellulitis, compartment syndrome, underlying fracture, tendon injury, or suspect any retained foreign body at this time.     D/C Information: Laceration/Wound Care/Suture Removal instructions given. The diagnosis, treatment plan, instructions for follow-up and reevaluation with his PCP. ED return precautions were discussed and understanding was verbalized. All questions or concerns have been addressed.          Scribe Attestation:   Scribe #1: I performed the above scribed service and the documentation accurately describes the services I performed. I attest to the accuracy of the note.         Scribe attestation: I, AGUILA Shaver, personally performed the services described in this documentation. All medical record entries made by the scribe were at my direction and in my presence.  I have reviewed the chart and agree that the record reflects my personal performance and is accurate and complete.          Clinical Impression:   Final diagnoses:  [S61.213A] Laceration of left middle finger without foreign body without damage to nail, initial encounter (Primary)          ED Disposition Condition    Discharge Stable        ED Prescriptions     Medication Sig Dispense Start Date End Date Auth. Provider    mupirocin (BACTROBAN) 2 % ointment Apply topically 3 (three) times daily. 15 g 4/18/2022  Clare Shaver NP        Follow-up Information     Follow up With Specialties Details Why Contact Info    [facility]  Schedule an appointment as soon as possible for a visit in 1 day For follow-up if you do not have a primary care doctor [address] [place] [address]  [phone]      [facility]  [facility] Emergency Medicine Go to  If symptoms worsen [address] [facility] [address] [phone]           Clare Shaver NP  04/18/22 9132

## 2022-05-30 ENCOUNTER — TELEPHONE (OUTPATIENT)
Dept: URGENT CARE | Facility: CLINIC | Age: 22
End: 2022-05-30
Payer: MEDICAID

## 2022-05-30 NOTE — TELEPHONE ENCOUNTER
Patient called clinic to get an albuterol inhaler refilled.  Last time patient was seen at urgent care was 11/2021.  Educated patient he is welcome to come into the clinic to be seen in person but we do not refill medications over the phone.

## 2022-05-31 ENCOUNTER — HOSPITAL ENCOUNTER (EMERGENCY)
Facility: HOSPITAL | Age: 22
Discharge: HOME | End: 2022-05-31
Attending: EMERGENCY MEDICINE
Payer: MEDICAID

## 2022-05-31 VITALS
TEMPERATURE: 99 F | HEART RATE: 81 BPM | OXYGEN SATURATION: 100 % | BODY MASS INDEX: 23.1 KG/M2 | WEIGHT: 180 LBS | RESPIRATION RATE: 20 BRPM | HEIGHT: 74 IN | SYSTOLIC BLOOD PRESSURE: 127 MMHG | DIASTOLIC BLOOD PRESSURE: 64 MMHG

## 2022-05-31 DIAGNOSIS — J45.909 ASTHMA, UNSPECIFIED ASTHMA SEVERITY, UNSPECIFIED WHETHER COMPLICATED, UNSPECIFIED WHETHER PERSISTENT: Primary | ICD-10-CM

## 2022-05-31 LAB
CTP QC/QA: YES
SARS-COV-2 RDRP RESP QL NAA+PROBE: NEGATIVE

## 2022-05-31 PROCEDURE — 99283 EMERGENCY DEPT VISIT LOW MDM: CPT | Mod: ER,25

## 2022-05-31 PROCEDURE — 94760 N-INVAS EAR/PLS OXIMETRY 1: CPT | Mod: ER

## 2022-05-31 PROCEDURE — U0002 COVID-19 LAB TEST NON-CDC: HCPCS | Mod: ER | Performed by: EMERGENCY MEDICINE

## 2022-05-31 PROCEDURE — 25000242 PHARM REV CODE 250 ALT 637 W/ HCPCS: Mod: ER | Performed by: NURSE PRACTITIONER

## 2022-05-31 PROCEDURE — 94640 AIRWAY INHALATION TREATMENT: CPT | Mod: ER

## 2022-05-31 RX ORDER — ALBUTEROL SULFATE 0.83 MG/ML
2.5 SOLUTION RESPIRATORY (INHALATION) EVERY 4 HOURS PRN
Qty: 150 ML | Refills: 0 | OUTPATIENT
Start: 2022-05-31 | End: 2022-08-30

## 2022-05-31 RX ORDER — ALBUTEROL SULFATE 90 UG/1
1-2 AEROSOL, METERED RESPIRATORY (INHALATION) EVERY 6 HOURS PRN
Qty: 18 G | Refills: 2 | OUTPATIENT
Start: 2022-05-31 | End: 2022-08-30

## 2022-05-31 RX ORDER — IPRATROPIUM BROMIDE AND ALBUTEROL SULFATE 2.5; .5 MG/3ML; MG/3ML
3 SOLUTION RESPIRATORY (INHALATION) ONCE
Status: COMPLETED | OUTPATIENT
Start: 2022-05-31 | End: 2022-05-31

## 2022-05-31 RX ADMIN — IPRATROPIUM BROMIDE AND ALBUTEROL SULFATE 3 ML: .5; 3 SOLUTION RESPIRATORY (INHALATION) at 02:05

## 2022-05-31 NOTE — ED PROVIDER NOTES
Encounter Date: 5/31/2022    SCRIBE #1 NOTE: I, Zhane Pittman, am scribing for, and in the presence of,  Abdullahi Ruth MD. I have scribed the following portions of the note - Other sections scribed: HPI, ROS, PE.       History     Chief Complaint   Patient presents with    Asthma     Pt states difficulty breathing last 2 days and ran out of inhaler. Also need refill for neb home treatments and inhaler.     Raymond Duarte is a 22 y.o. male with a history of Asthma and Epilepsy who presents to the ED for evaluation of Asthma exacerbation which caused difficulty breathing this morning after he ran out of his inhaler 2 days ago. The patient requests a refill of his albuterol. The patient notes associated symptoms of cough. Denies fever, cold and congestion. No other alleviating or exacerbating factors. No known allergies.     The history is provided by the patient. No  was used.     Review of patient's allergies indicates:  No Known Allergies  Past Medical History:   Diagnosis Date    Asthma     Seizures      No past surgical history on file.  No family history on file.  Social History     Tobacco Use    Smoking status: Never Smoker    Smokeless tobacco: Never Used   Substance Use Topics    Alcohol use: Never    Drug use: Never     Review of Systems   Constitutional: Negative.  Negative for fever.   HENT: Negative for congestion.    Eyes: Negative for visual disturbance.   Respiratory: Positive for cough and shortness of breath.    Cardiovascular: Negative for chest pain.   Gastrointestinal: Negative for abdominal pain.   Genitourinary: Negative for dysuria.   Musculoskeletal: Negative for neck stiffness.   Skin: Negative for rash.   Neurological: Negative for facial asymmetry.       Physical Exam     Initial Vitals [05/31/22 1230]   BP Pulse Resp Temp SpO2   125/71 87 20 99.2 °F (37.3 °C) 97 %      MAP       --         Physical Exam    Constitutional: He appears well-developed and  well-nourished.   HENT:   Head: Normocephalic and atraumatic.   Eyes: EOM are normal. Pupils are equal, round, and reactive to light.   Neck: Neck supple. No thyromegaly present. No JVD present.   Normal range of motion.  Cardiovascular: Normal rate and regular rhythm. Exam reveals no gallop and no friction rub.    No murmur heard.  Pulmonary/Chest: No respiratory distress.   Scant expiratory wheezes   Abdominal: There is no abdominal tenderness.   Musculoskeletal:      Cervical back: Normal range of motion and neck supple.     Neurological: He is alert and oriented to person, place, and time. He has normal strength. GCS eye subscore is 4. GCS verbal subscore is 5. GCS motor subscore is 6.   Skin: Skin is warm and dry. Capillary refill takes less than 2 seconds.         ED Course   Procedures  Labs Reviewed   SARS-COV-2 RDRP GENE   PT requesting only refills and no treatment here at this time. VSS       Imaging Results    None          Medications   albuterol-ipratropium 2.5 mg-0.5 mg/3 mL nebulizer solution 3 mL (3 mLs Nebulization Given 5/31/22 1403)     Medical Decision Making:   History:   Old Medical Records: I decided to obtain old medical records.  Clinical Tests:   Lab Tests: Ordered and Reviewed          Scribe Attestation:   Scribe #1: I performed the above scribed service and the documentation accurately describes the services I performed. I attest to the accuracy of the note.               I, Abdullahi Ruth, personally performed the services described in this documentation. All medical record entries made by the scribe were at my direction and in my presence.  I have reviewed the chart and agree that the record reflects my personal performance and is accurate and complete    Clinical Impression:   Final diagnoses:  [J45.909] Asthma, unspecified asthma severity, unspecified whether complicated, unspecified whether persistent (Primary)          ED Disposition Condition    Discharge Stable        ED  Prescriptions     Medication Sig Dispense Start Date End Date Auth. Provider    albuterol (PROVENTIL/VENTOLIN HFA) 90 mcg/actuation inhaler Inhale 1-2 puffs into the lungs every 6 (six) hours as needed for Wheezing. Rescue 18 g 5/31/2022 8/29/2022 Abdullahi Ruth MD    albuterol (PROVENTIL) 2.5 mg /3 mL (0.083 %) nebulizer solution Take 3 mLs (2.5 mg total) by nebulization every 4 (four) hours as needed for Wheezing. 150 mL 5/31/2022  Abdullahi Ruth MD        Follow-up Information     Follow up With Specialties Details Why Contact Info    [facility] Emergency Medicine  As needed [address] [facility] [address] [phone]    [facility]  Schedule an appointment as soon as possible for a visit  to establish primary care. or follow up with the primary care doctor of your choice. [address] [place] [id number] [phone]      [facility] Emergency Medicine  As needed, If symptoms worsen, fever, or any concerns. [address] [facility] [address] [phone]           Abdullahi Ruth MD  05/31/22 5765

## 2022-05-31 NOTE — FIRST PROVIDER EVALUATION
Emergency Department TeleTriage Encounter Note      CHIEF COMPLAINT    Chief Complaint   Patient presents with    Asthma     Pt states difficulty breathing last 2 days and ran out of inhaler. Also need refill for neb home treatments and inhaler.       VITAL SIGNS   Initial Vitals [05/31/22 1230]   BP Pulse Resp Temp SpO2   125/71 87 20 99.2 °F (37.3 °C) 97 %      MAP       --            ALLERGIES    Review of patient's allergies indicates:  No Known Allergies    PROVIDER TRIAGE NOTE  This is a teletriage evaluation of a 22 y.o. male presenting to the ED complaining of SOB for two days.  Reports that this is his asthma. Out of inhaler and nebs.  Last used inhaler this morning. Denies fever and cough.     Pt is well-appearing, no respiratory distress.     Initial orders will be placed and care will be transferred to an alternate provider when patient is roomed for a full evaluation. Any additional orders and the final disposition will be determined by that provider.           ORDERS  Labs Reviewed - No data to display    ED Orders (720h ago, onward)    Start Ordered     Status Ordering Provider    05/31/22 1400 05/31/22 1257  albuterol-ipratropium 2.5 mg-0.5 mg/3 mL nebulizer solution 3 mL  Once         Ordered DICKSON LUCIANO.    05/31/22 1258 05/31/22 1257  Inhalation Treatment Once  Once         Ordered DICKSON LUCIANO            Virtual Visit Note: The provider triage portion of this emergency department evaluation and documentation was performed via [employer], a HIPAA-compliant telemedicine application, in concert with a tele-presenter in the room. A face to face patient evaluation with one of my colleagues will occur once the patient is placed in an emergency department room.      DISCLAIMER: This note was prepared with [employer] voice recognition transcription software. Garbled syntax, mangled pronouns, and other bizarre constructions may be attributed to that software system.

## 2022-08-30 ENCOUNTER — HOSPITAL ENCOUNTER (EMERGENCY)
Facility: HOSPITAL | Age: 22
Discharge: HOME | End: 2022-08-30
Attending: EMERGENCY MEDICINE
Payer: MEDICAID

## 2022-08-30 VITALS
DIASTOLIC BLOOD PRESSURE: 82 MMHG | TEMPERATURE: 98 F | BODY MASS INDEX: 23.1 KG/M2 | RESPIRATION RATE: 20 BRPM | SYSTOLIC BLOOD PRESSURE: 121 MMHG | WEIGHT: 180 LBS | OXYGEN SATURATION: 98 % | HEIGHT: 74 IN | HEART RATE: 98 BPM

## 2022-08-30 DIAGNOSIS — J45.41 MODERATE PERSISTENT REACTIVE AIRWAY DISEASE WITH ACUTE EXACERBATION: Primary | ICD-10-CM

## 2022-08-30 DIAGNOSIS — R06.02 SHORTNESS OF BREATH: ICD-10-CM

## 2022-08-30 LAB
CTP QC/QA: YES
CTP QC/QA: YES
POC MOLECULAR INFLUENZA A AGN: NEGATIVE
POC MOLECULAR INFLUENZA B AGN: NEGATIVE
SARS-COV-2 RDRP RESP QL NAA+PROBE: NEGATIVE

## 2022-08-30 PROCEDURE — 87502 INFLUENZA DNA AMP PROBE: CPT

## 2022-08-30 PROCEDURE — 94761 N-INVAS EAR/PLS OXIMETRY MLT: CPT

## 2022-08-30 PROCEDURE — 99285 EMERGENCY DEPT VISIT HI MDM: CPT | Mod: 25

## 2022-08-30 PROCEDURE — 94640 AIRWAY INHALATION TREATMENT: CPT | Mod: XB

## 2022-08-30 PROCEDURE — 93010 ELECTROCARDIOGRAM REPORT: CPT | Mod: ,,, | Performed by: INTERNAL MEDICINE

## 2022-08-30 PROCEDURE — 93005 ELECTROCARDIOGRAM TRACING: CPT

## 2022-08-30 PROCEDURE — 25000242 PHARM REV CODE 250 ALT 637 W/ HCPCS: Performed by: EMERGENCY MEDICINE

## 2022-08-30 PROCEDURE — 63600175 PHARM REV CODE 636 W HCPCS: Performed by: EMERGENCY MEDICINE

## 2022-08-30 PROCEDURE — U0002 COVID-19 LAB TEST NON-CDC: HCPCS | Performed by: EMERGENCY MEDICINE

## 2022-08-30 PROCEDURE — 93010 EKG 12-LEAD: ICD-10-PCS | Mod: ,,, | Performed by: INTERNAL MEDICINE

## 2022-08-30 RX ORDER — PREDNISONE 20 MG/1
TABLET ORAL
Qty: 7 TABLET | Refills: 0 | Status: SHIPPED | OUTPATIENT
Start: 2022-08-30 | End: 2022-09-05

## 2022-08-30 RX ORDER — PREDNISONE 20 MG/1
60 TABLET ORAL
Status: COMPLETED | OUTPATIENT
Start: 2022-08-30 | End: 2022-08-30

## 2022-08-30 RX ORDER — FLUTICASONE PROPIONATE AND SALMETEROL 113; 14 UG/1; UG/1
1 POWDER, METERED RESPIRATORY (INHALATION) 2 TIMES DAILY
Qty: 1 EACH | Refills: 1 | Status: SHIPPED | OUTPATIENT
Start: 2022-08-30 | End: 2022-09-29

## 2022-08-30 RX ORDER — ALBUTEROL SULFATE 90 UG/1
2 AEROSOL, METERED RESPIRATORY (INHALATION) EVERY 4 HOURS PRN
Qty: 6.7 G | Refills: 1 | OUTPATIENT
Start: 2022-08-30 | End: 2023-05-01

## 2022-08-30 RX ORDER — IPRATROPIUM BROMIDE AND ALBUTEROL SULFATE 2.5; .5 MG/3ML; MG/3ML
3 SOLUTION RESPIRATORY (INHALATION) EVERY 4 HOURS PRN
Qty: 75 ML | Refills: 1 | OUTPATIENT
Start: 2022-08-30 | End: 2023-05-01

## 2022-08-30 RX ORDER — IPRATROPIUM BROMIDE AND ALBUTEROL SULFATE 2.5; .5 MG/3ML; MG/3ML
3 SOLUTION RESPIRATORY (INHALATION)
Status: COMPLETED | OUTPATIENT
Start: 2022-08-30 | End: 2022-08-30

## 2022-08-30 RX ADMIN — IPRATROPIUM BROMIDE AND ALBUTEROL SULFATE 3 ML: 2.5; .5 SOLUTION RESPIRATORY (INHALATION) at 08:08

## 2022-08-30 RX ADMIN — PREDNISONE 60 MG: 20 TABLET ORAL at 09:08

## 2022-08-31 NOTE — DISCHARGE INSTRUCTIONS
Continue with duoneb treatments every 4-6hrs to help with continued wheeze/shortness of breath/chest tightness. If you continue with shortness of breath, use the neb treatment every 15min for 3 treatments when you arrive home, after that every 4-6hrs. Rescue albuterol inhaler as needed when you are not at home. Take prednisone as prescribed. Begin using the airduo twice daily.     Please follow-up and establish care with a local primary care provider for reevaluation, further recommendations.     Return if you begin with fever, worsening shortness of breath, worsening chest pain, if you feel lightheaded or feel as if you are going to pass out, if any other problems occur.

## 2022-08-31 NOTE — FIRST PROVIDER EVALUATION
"Medical screening exam completed.  I have conducted a focused provider triage encounter, findings are as follows:    Brief history of present illness: SOB. No fever. H/O asthma.     Vitals:    08/30/22 1909   BP: (!) 128/90   Pulse: 106   Resp: 20   Temp: 98.1 °F (36.7 °C)   TempSrc: Oral   SpO2: (!) 91%   Weight: 81.6 kg (180 lb)   Height: 6' 2" (1.88 m)       Pertinent physical exam:  No distress. Expiratory wheezing.     Brief workup plan:  Covid test. Asthma treatment.     Preliminary workup initiated; this workup will be continued and followed by the physician or advanced practice provider that is assigned to the patient when roomed.  "

## 2022-08-31 NOTE — ED PROVIDER NOTES
Encounter Date: 8/30/2022       History     Chief Complaint   Patient presents with    Asthma     Pt presents to ED secondary to shortness of breath x1 day. States albuterol inhaler has not been helping today. Endorses chest tightness. Audible wheezing noted.      22-year-old male with chief complaint acute onset cough, shortness of breath, chest tightness, wheezing, all beginning this morning.    No fever.  No recent illness.  No known sick contacts.  No congestion or rhinorrhea.  No odynophagia.  Admits to history of similar asthma exacerbations in the past.  Denies any history of any cardiac issues.  No leg swelling.  No calf pain.  No exogenous estrogen.  No history of hypertension, hyperlipidemia, DM.  Ran out of nebulized albuterol treatments.  No relief with short-acting beta agonist inhaler.    No daily LABA/ICS--ran out    Past medical history:  Asthma   [facility]    Review of patient's allergies indicates:  No Known Allergies  Past Medical History:   Diagnosis Date    Asthma     Seizures      History reviewed. No pertinent surgical history.  History reviewed. No pertinent family history.  Social History     Tobacco Use    Smoking status: Never    Smokeless tobacco: Never   Substance Use Topics    Alcohol use: Never    Drug use: Never     Review of Systems   Constitutional:  Negative for diaphoresis and fever.   HENT:  Negative for congestion, rhinorrhea and sore throat.    Respiratory:  Positive for cough, shortness of breath and wheezing.    Cardiovascular:  Positive for chest pain. Negative for leg swelling.   Gastrointestinal:  Negative for nausea and vomiting.   Musculoskeletal:  Negative for myalgias, neck pain and neck stiffness.   Neurological:  Negative for syncope.     Physical Exam     Initial Vitals [08/30/22 1909]   BP Pulse Resp Temp SpO2   (!) 128/90 106 20 98.1 °F (36.7 °C) (!) 91 %      MAP       --         Physical Exam    Nursing note and vitals reviewed.  Constitutional: He  appears well-developed and well-nourished. He is not diaphoretic. No distress.   HENT:   Head: Normocephalic and atraumatic.   Neck: Neck supple.   Normal range of motion.  Pulmonary/Chest: No respiratory distress.   Prolonged expiratory phase, wheeze throughout lung fields.  SpO2 95% on room air.  No tachypnea.  No accessory muscle use.   Musculoskeletal:      Cervical back: Normal range of motion and neck supple.     Neurological: He is alert and oriented to person, place, and time. GCS score is 15. GCS eye subscore is 4. GCS verbal subscore is 5. GCS motor subscore is 6.   Skin: Skin is warm. Capillary refill takes less than 2 seconds.   Psychiatric: He has a normal mood and affect. Thought content normal.       ED Course   Procedures  Labs Reviewed   SARS-COV-2 RDRP GENE   POCT INFLUENZA A/B MOLECULAR     EKG Readings: (Independently Interpreted)   Sinus tachycardia, ventricular rate 104 beats per minute.  Normal MT, normal QT.  mild right axis deviation.  No ST elevation.  No previous for comparison.     Imaging Results    None          Medications   predniSONE tablet 60 mg (60 mg Oral Given 8/30/22 2114)   albuterol-ipratropium 2.5 mg-0.5 mg/3 mL nebulizer solution 3 mL (3 mLs Nebulization Given 8/30/22 2021)     Medical Decision Making:   Differential Diagnosis:   Viral illness, pneumonia, bronchitis, pharyngitis, PE, ACS  Clinical Tests:   Lab Tests: Ordered and Reviewed  Medical Tests: Ordered and Reviewed  ED Management:  On re-evaluation, improved air movement, however still with expiratory wheeze, prolonged expiratory phase.  No hypoxia. Offered repeat nebs, continue monitoring in the ED, however patient feels comfortable with discharge and outpatient follow-up.  Admits to nearly daily nighttime awakenings.  No longer with daily controller.  Advised following up with a local primary for re-evaluation further recommendations.  Return precautions given.                    Clinical Impression:   Final  diagnoses:  [R06.02] Shortness of breath  [J45.41] Moderate persistent reactive airway disease with acute exacerbation (Primary)        ED Disposition Condition    Discharge Stable          ED Prescriptions       Medication Sig Dispense Start Date End Date Auth. Provider    fluticasone propion-salmeteroL (AIRDUO DIGIHALER) 113 mcg-14 mcg/actuation aebs Inhale 1 Inhaler into the lungs 2 (two) times daily. 1 each 8/30/2022 9/29/2022 Jonny King PA-C    predniSONE (DELTASONE) 20 MG tablet Take 2 tablets (40 mg total) by mouth once daily for 2 days, THEN 1 tablet (20 mg total) once daily for 2 days, THEN 0.5 tablets (10 mg total) once daily for 2 days. 7 tablet 8/30/2022 9/5/2022 Jonny King PA-C    albuterol-ipratropium (DUO-NEB) 2.5 mg-0.5 mg/3 mL nebulizer solution Take 3 mLs by nebulization every 4 (four) hours as needed for Wheezing or Shortness of Breath. Rescue 75 mL 8/30/2022 8/30/2023 Jonny King PA-C    albuterol (PROVENTIL/VENTOLIN HFA) 90 mcg/actuation inhaler Inhale 2 puffs into the lungs every 4 (four) hours as needed for Wheezing or Shortness of Breath. Rescue 6.7 g 8/30/2022 8/30/2023 Jonny King PA-C          Follow-up Information       Follow up With Specialties Details Why Contact Info    [facility] [place]  [place]  Schedule an appointment as soon as possible for a visit  To establish primary care physician, for reevaluation [address]  [place] [address]  [phone]               Jonny King PA-C  08/31/22 022

## 2023-05-01 ENCOUNTER — HOSPITAL ENCOUNTER (EMERGENCY)
Facility: HOSPITAL | Age: 23
Discharge: HOME | End: 2023-05-01
Attending: EMERGENCY MEDICINE
Payer: MEDICAID

## 2023-05-01 VITALS
BODY MASS INDEX: 23.74 KG/M2 | RESPIRATION RATE: 18 BRPM | DIASTOLIC BLOOD PRESSURE: 81 MMHG | SYSTOLIC BLOOD PRESSURE: 114 MMHG | HEIGHT: 74 IN | WEIGHT: 185 LBS | OXYGEN SATURATION: 97 % | TEMPERATURE: 99 F | HEART RATE: 100 BPM

## 2023-05-01 DIAGNOSIS — R19.7 NAUSEA, VOMITING, AND DIARRHEA: ICD-10-CM

## 2023-05-01 DIAGNOSIS — R10.84 GENERALIZED ABDOMINAL PAIN: ICD-10-CM

## 2023-05-01 DIAGNOSIS — U07.1 COVID-19 VIRUS INFECTION: Primary | ICD-10-CM

## 2023-05-01 DIAGNOSIS — R11.2 NAUSEA, VOMITING, AND DIARRHEA: ICD-10-CM

## 2023-05-01 LAB
ALBUMIN SERPL-MCNC: 5.4 G/DL (ref 3.3–5.5)
ALBUMIN SERPL-MCNC: 5.6 G/DL (ref 3.3–5.5)
ALP SERPL-CCNC: 100 U/L (ref 42–141)
ALP SERPL-CCNC: 100 U/L (ref 42–141)
BILIRUB SERPL-MCNC: 1.3 MG/DL (ref 0.2–1.6)
BILIRUB SERPL-MCNC: 1.4 MG/DL (ref 0.2–1.6)
BILIRUBIN, POC UA: NEGATIVE
BLOOD, POC UA: NEGATIVE
BUN SERPL-MCNC: 8 MG/DL (ref 7–22)
CALCIUM SERPL-MCNC: 10.5 MG/DL (ref 8–10.3)
CHLORIDE SERPL-SCNC: 108 MMOL/L (ref 98–108)
CLARITY, POC UA: CLEAR
COLOR, POC UA: YELLOW
CREAT SERPL-MCNC: 1.1 MG/DL (ref 0.6–1.2)
CTP QC/QA: YES
GLUCOSE SERPL-MCNC: 83 MG/DL (ref 73–118)
GLUCOSE, POC UA: NEGATIVE
INFLUENZA A ANTIGEN, POC: NEGATIVE
INFLUENZA B ANTIGEN, POC: NEGATIVE
KETONES, POC UA: ABNORMAL
LEUKOCYTE EST, POC UA: NEGATIVE
NITRITE, POC UA: NEGATIVE
PH UR STRIP: 7.5 [PH]
POC ALT (SGPT): 21 U/L (ref 10–47)
POC ALT (SGPT): 25 U/L (ref 10–47)
POC AMYLASE: 87 U/L (ref 14–97)
POC AST (SGOT): 35 U/L (ref 11–38)
POC AST (SGOT): 37 U/L (ref 11–38)
POC GGT: 16 U/L (ref 5–65)
POC RAPID STREP A: NEGATIVE
POC TCO2: 27 MMOL/L (ref 18–33)
POTASSIUM BLD-SCNC: 4.6 MMOL/L (ref 3.6–5.1)
PROTEIN, POC UA: ABNORMAL
PROTEIN, POC: 8.3 G/DL (ref 6.4–8.1)
PROTEIN, POC: 8.5 G/DL (ref 6.4–8.1)
SARS-COV-2 RDRP RESP QL NAA+PROBE: POSITIVE
SODIUM BLD-SCNC: 140 MMOL/L (ref 128–145)
SPECIFIC GRAVITY, POC UA: 1.02
UROBILINOGEN, POC UA: 4 E.U./DL

## 2023-05-01 PROCEDURE — 96375 TX/PRO/DX INJ NEW DRUG ADDON: CPT | Mod: ER

## 2023-05-01 PROCEDURE — 99284 EMERGENCY DEPT VISIT MOD MDM: CPT | Mod: 25,ER

## 2023-05-01 PROCEDURE — 25000003 PHARM REV CODE 250: Mod: ER | Performed by: NURSE PRACTITIONER

## 2023-05-01 PROCEDURE — 80053 COMPREHEN METABOLIC PANEL: CPT | Mod: ER

## 2023-05-01 PROCEDURE — 81001 URINALYSIS AUTO W/SCOPE: CPT | Mod: ER

## 2023-05-01 PROCEDURE — 85025 COMPLETE CBC W/AUTO DIFF WBC: CPT | Mod: ER

## 2023-05-01 PROCEDURE — 96374 THER/PROPH/DIAG INJ IV PUSH: CPT | Mod: ER

## 2023-05-01 PROCEDURE — 82150 ASSAY OF AMYLASE: CPT | Mod: ER

## 2023-05-01 PROCEDURE — 63600175 PHARM REV CODE 636 W HCPCS: Mod: ER | Performed by: NURSE PRACTITIONER

## 2023-05-01 PROCEDURE — 96361 HYDRATE IV INFUSION ADD-ON: CPT | Mod: ER

## 2023-05-01 RX ORDER — IBUPROFEN 600 MG/1
600 TABLET ORAL EVERY 6 HOURS PRN
Qty: 20 TABLET | Refills: 0 | Status: SHIPPED | OUTPATIENT
Start: 2023-05-01 | End: 2023-05-06

## 2023-05-01 RX ORDER — ONDANSETRON 4 MG/1
4 TABLET, ORALLY DISINTEGRATING ORAL EVERY 8 HOURS PRN
Qty: 20 TABLET | Refills: 0 | Status: SHIPPED | OUTPATIENT
Start: 2023-05-01 | End: 2023-05-06

## 2023-05-01 RX ORDER — ONDANSETRON 2 MG/ML
8 INJECTION INTRAMUSCULAR; INTRAVENOUS
Status: COMPLETED | OUTPATIENT
Start: 2023-05-01 | End: 2023-05-01

## 2023-05-01 RX ORDER — DEXTROMETHORPHAN HYDROBROMIDE, GUAIFENESIN 5; 100 MG/5ML; MG/5ML
650 LIQUID ORAL EVERY 8 HOURS
Qty: 20 TABLET | Refills: 0 | Status: SHIPPED | OUTPATIENT
Start: 2023-05-01 | End: 2023-05-06

## 2023-05-01 RX ORDER — KETOROLAC TROMETHAMINE 30 MG/ML
15 INJECTION, SOLUTION INTRAMUSCULAR; INTRAVENOUS
Status: COMPLETED | OUTPATIENT
Start: 2023-05-01 | End: 2023-05-01

## 2023-05-01 RX ORDER — DICYCLOMINE HYDROCHLORIDE 10 MG/1
10 CAPSULE ORAL 3 TIMES DAILY
Qty: 30 CAPSULE | Refills: 0 | Status: SHIPPED | OUTPATIENT
Start: 2023-05-01 | End: 2023-05-11

## 2023-05-01 RX ORDER — PROMETHAZINE HYDROCHLORIDE 25 MG/1
25 SUPPOSITORY RECTAL EVERY 6 HOURS PRN
Qty: 20 SUPPOSITORY | Refills: 0 | Status: SHIPPED | OUTPATIENT
Start: 2023-05-01 | End: 2023-05-06

## 2023-05-01 RX ADMIN — SODIUM CHLORIDE 1000 ML: 9 INJECTION, SOLUTION INTRAVENOUS at 03:05

## 2023-05-01 RX ADMIN — ONDANSETRON 8 MG: 2 INJECTION INTRAMUSCULAR; INTRAVENOUS at 02:05

## 2023-05-01 RX ADMIN — KETOROLAC TROMETHAMINE 15 MG: 30 INJECTION, SOLUTION INTRAMUSCULAR; INTRAVENOUS at 02:05

## 2023-05-01 NOTE — DISCHARGE INSTRUCTIONS

## 2023-05-01 NOTE — ED PROVIDER NOTES
Encounter Date: 5/1/2023    SCRIBE #1 NOTE: I, Niranjan Flowers, am scribing for, and in the presence of,  KAELYN Perez.     History     Chief Complaint   Patient presents with    Headache    Vomiting    Diarrhea    Nausea    Cough     23 y.o. male with a PMH of asthma who presents to the Emergency Department with complaints of abdominal pain, nausea, vomiting, and diarrhea since this morning. He also reports associated symptoms of headache, dry cough. He denies rash, fever, chest pain, SOB, numbness, weakness, tingling,  back pain, dysuria, hematuria, or any other complaints. He rates the pain as 8/10 and has not taken any medications for the symptoms. No alleviating/aggravating factors.  Patient had the COVID vaccine but not the flu vaccine this year. NKDA.     The history is provided by the patient. No  was used.   Review of patient's allergies indicates:  No Known Allergies  Past Medical History:   Diagnosis Date    Asthma     Seizures      History reviewed. No pertinent surgical history.  History reviewed. No pertinent family history.  Social History     Tobacco Use    Smoking status: Never    Smokeless tobacco: Never   Substance Use Topics    Alcohol use: Never    Drug use: Never     Review of Systems   Constitutional:  Negative for chills and fever.   HENT:  Negative for congestion, ear pain, rhinorrhea and sore throat.    Eyes:  Negative for pain, discharge and redness.   Respiratory:  Positive for cough. Negative for shortness of breath.    Cardiovascular:  Negative for chest pain.   Gastrointestinal:  Positive for abdominal pain, diarrhea, nausea and vomiting.   Genitourinary:  Negative for dysuria and hematuria.   Musculoskeletal:  Negative for back pain, neck pain and neck stiffness.   Skin:  Negative for rash.   Neurological:  Positive for headaches. Negative for dizziness, weakness, light-headedness and numbness.   Psychiatric/Behavioral:  Negative for confusion.      Physical Exam      Initial Vitals [05/01/23 1131]   BP Pulse Resp Temp SpO2   126/78 100 20 98.7 °F (37.1 °C) 96 %      MAP       --         Physical Exam    Nursing note and vitals reviewed.  Constitutional: Vital signs are normal. He appears well-developed and well-nourished. He is cooperative.  Non-toxic appearance. He does not appear ill.   HENT:   Head: Normocephalic and atraumatic.   Right Ear: Tympanic membrane, external ear and ear canal normal.   Left Ear: Tympanic membrane, external ear and ear canal normal.   Nose: Nose normal.   Mouth/Throat: Uvula is midline, oropharynx is clear and moist and mucous membranes are normal.   Eyes: Conjunctivae and EOM are normal. Pupils are equal, round, and reactive to light.   Neck: Neck supple. No Brudzinski's sign and no Kernig's sign noted.   Normal range of motion.  Cardiovascular:  Normal rate and regular rhythm.           Pulmonary/Chest: Effort normal and breath sounds normal.   Abdominal: Abdomen is soft. Bowel sounds are normal. There is no abdominal tenderness.   No right CVA tenderness.  No left CVA tenderness. There is no rebound and no guarding.   Musculoskeletal:         General: Normal range of motion.      Cervical back: Normal range of motion and neck supple.     Lymphadenopathy:     He has no cervical adenopathy.   Neurological: He is alert and oriented to person, place, and time. No cranial nerve deficit. GCS eye subscore is 4. GCS verbal subscore is 5. GCS motor subscore is 6.   Skin: Skin is warm, dry and intact. No rash noted.   Psychiatric: He has a normal mood and affect. His speech is normal and behavior is normal. Thought content normal.       ED Course   Procedures  Labs Reviewed   SARS-COV-2 RDRP GENE - Abnormal; Notable for the following components:       Result Value    POC Rapid COVID Positive (*)     All other components within normal limits    Narrative:     This test utilizes isothermal nucleic acid amplification technology to detect the SARS-CoV-2  RdRp nucleic acid segment. The analytical sensitivity (limit of detection) is 500 copies/swab.     A POSITIVE result is indicative of the presence of SARS-CoV-2 RNA; clinical correlation with patient history and other diagnostic information is necessary to determine patient infection status.    A NEGATIVE result means that SARS-CoV-2 nucleic acids are not present above the limit of detection. A NEGATIVE result should be treated as presumptive. It does not rule out the possibility of COVID-19 and should not be the sole basis for treatment decisions. If COVID-19 is strongly suspected based on clinical and exposure history, re-testing using an alternate molecular assay should be considered.     This test is only for use under the Food and Drug Administration s Emergency Use Authorization (EUA).     Commercial kits are provided by "[employer] Performance characteristics of the EUA have been independently verified by [facility] Department of Pathology and Laboratory Medicine.   _________________________________________________________________   The authorized Fact Sheet for Healthcare Providers and the authorized Fact Sheet for Patients of the ID NOW COVID-19 are available on the FDA website:    https://www.fda.gov/media/418161/download      https://www.fda.gov/media/516344/download      POCT URINALYSIS W/O SCOPE - Abnormal; Notable for the following components:    Ketones, UA 1+ (*)     Protein, UA Trace (*)     Urobilinogen, UA 4.0 (*)     All other components within normal limits   POCT CMP - Abnormal; Notable for the following components:    Calcium, POC 10.5 (*)     Protein, POC 8.5 (*)     All other components within normal limits   POCT LIVER PANEL - Abnormal; Notable for the following components:    Protein, POC 8.3 (*)     All other components within normal limits   POCT CBC   POCT URINALYSIS W/O SCOPE   POCT INFLUENZA A/B MOLECULAR   POCT CMP   POCT LIVER PANEL   POCT STREP A, RAPID   POCT  RAPID INFLUENZA A/B          Imaging Results    None          Medications   sodium chloride 0.9% bolus 1,000 mL 1,000 mL (0 mLs Intravenous Stopped 5/1/23 1643)   ondansetron injection 8 mg (8 mg Intravenous Given 5/1/23 1434)   ketorolac injection 15 mg (15 mg Intravenous Given 5/1/23 1436)     Medical Decision Making:   History:   Old Medical Records: I decided to obtain old medical records.  Clinical Tests:   Lab Tests: Reviewed and Ordered     APC / Resident Notes:   This is an urgent evaluation of a 23 y.o. male that presents to the Emergency Department for URI Symptoms for 1 day.  The patient is a non-toxic, afebrile, and well appearing male. On physical exam: Ears: without infection.  Pharynx without infection. Appears well hydrated with moist mucus membranes. Neck soft and supple with no meningeal signs or cervical lymphadenopathy.  Breath sounds are clear and equal bilaterally with no adventitious breath sounds, tachypnea or respiratory distress.  Oxygen saturation is 98% on Room Air, no evidence of hypoxia.  Soft non-tender abdomen with no guarding or rebound; normal bowel sounds; no CVA or flank tenderness; no distention    Vital Signs: 119/47, 99.9, 100, 18, 98%   If available, previous records reviewed.   I ordered labs and personally reviewed them.  Labs significant for amylase 87, BUN 8, creatinine 1.1, WBC 9.0, H&H 15&47, plt 280  Flu: Negative  COVID-19: Positive    Given the above findings, my overall impression is COVID infection, N/V/D, generalized abd pain. Given the above findings, I do not think the patient has Flu, OM, OE, strep pharyngitis, meningitis, pneumonia, bacterial sinusitis, or significant dehydration requiring IV fluids or admission    During his stay in the ED, the patient has been given zofran, toradol, NS with good relief of his symptoms. The patient will be discharged home with zofran, phenergan, bentyl. Additional home care recommendations include Tylenol/Ibuprofen, Hydration.  The diagnosis, treatment plan, instructions for follow-up, strict return precautions, and reevaluation with his PCP as well as ED return precautions have been discussed with the patient and he has verbalized an understanding of the information.  All questions or concerns from the patient have been addressed.        Scribe Attestation:   Scribe #1: I performed the above scribed service and the documentation accurately describes the services I performed. I attest to the accuracy of the note.            I, IZABELA Perez, personally performed the services described in this documentation.  All medical record entries made by the scribe were at my direction and in my presence.  I have reviewed the chart and agree that the record reflects my personal performance and is accurate and complete.       Clinical Impression:   Final diagnoses:  [U07.1] COVID-19 virus infection (Primary)  [R10.84] Generalized abdominal pain  [R11.2, R19.7] Nausea, vomiting, and diarrhea        ED Disposition Condition    Discharge Stable          ED Prescriptions       Medication Sig Dispense Start Date End Date Auth. Provider    acetaminophen (TYLENOL) 650 MG TbSR Take 1 tablet (650 mg total) by mouth every 8 (eight) hours. for 5 days 20 tablet 5/1/2023 5/6/2023 KAELYN Soria    ibuprofen (ADVIL,MOTRIN) 600 MG tablet Take 1 tablet (600 mg total) by mouth every 6 (six) hours as needed for Pain. 20 tablet 5/1/2023 5/6/2023 KAELYN Soria    ondansetron (ZOFRAN-ODT) 4 MG TbDL Take 1 tablet (4 mg total) by mouth every 8 (eight) hours as needed (nausea). 20 tablet 5/1/2023 5/6/2023 KAELYN Soria    dicyclomine (BENTYL) 10 MG capsule Take 1 capsule (10 mg total) by mouth 3 (three) times daily. for 10 days 30 capsule 5/1/2023 5/11/2023 KAELYN Soria    promethazine (PHENERGAN) 25 MG suppository Place 1 suppository (25 mg total) rectally every 6 (six) hours as needed for Nausea. 20 suppository 5/1/2023 5/6/2023 KAELYN Soria           Follow-up Information       Follow up With Specialties Details Why Contact Info    [facility] [place]  Schedule an appointment as soon as possible for a visit in 2 days  [address] [place] [address]  [phone]      [facility] ED Emergency Medicine Go to  If symptoms worsen [address]  [facility] [address] [phone]             Tarsha Walton, KAELYN  05/01/23 0698

## 2023-05-01 NOTE — Clinical Note
"Raymond"Eric Duarte was seen and treated in our emergency department on 5/1/2023.     COVID-19 is present in our communities across the state. There is limited testing for COVID at this time, so not all patients can be tested. In this situation, your employee meets the following criteria:    Raymond Duarte has met the criteria for COVID-19 testing and has a POSITIVE result. He can return to work once they are asymptomatic for 24 hours without the use of fever reducing medications AND at least five days from the first positive result. A mask is recommended for 5 days post quarantine.     If you have any questions or concerns, or if I can be of further assistance, please do not hesitate to contact me.    Sincerely,             KAELYN Soria"

## 2023-06-02 ENCOUNTER — HOSPITAL ENCOUNTER (EMERGENCY)
Facility: HOSPITAL | Age: 23
Discharge: HOME | End: 2023-06-02
Attending: EMERGENCY MEDICINE
Payer: MEDICAID

## 2023-06-02 VITALS
OXYGEN SATURATION: 99 % | SYSTOLIC BLOOD PRESSURE: 112 MMHG | DIASTOLIC BLOOD PRESSURE: 84 MMHG | RESPIRATION RATE: 16 BRPM | TEMPERATURE: 99 F | WEIGHT: 170.19 LBS | HEIGHT: 74 IN | BODY MASS INDEX: 21.84 KG/M2 | HEART RATE: 96 BPM

## 2023-06-02 DIAGNOSIS — J30.2 SEASONAL ALLERGIES: ICD-10-CM

## 2023-06-02 DIAGNOSIS — J45.901 EXACERBATION OF ASTHMA, UNSPECIFIED ASTHMA SEVERITY, UNSPECIFIED WHETHER PERSISTENT: Primary | ICD-10-CM

## 2023-06-02 PROCEDURE — 63600175 PHARM REV CODE 636 W HCPCS: Mod: ER | Performed by: EMERGENCY MEDICINE

## 2023-06-02 PROCEDURE — 25000242 PHARM REV CODE 250 ALT 637 W/ HCPCS: Mod: ER | Performed by: EMERGENCY MEDICINE

## 2023-06-02 PROCEDURE — 99284 EMERGENCY DEPT VISIT MOD MDM: CPT | Mod: 25,ER

## 2023-06-02 PROCEDURE — 94760 N-INVAS EAR/PLS OXIMETRY 1: CPT | Mod: ER

## 2023-06-02 PROCEDURE — 94640 AIRWAY INHALATION TREATMENT: CPT | Mod: ER

## 2023-06-02 RX ORDER — ALBUTEROL SULFATE 90 UG/1
2 AEROSOL, METERED RESPIRATORY (INHALATION) EVERY 6 HOURS PRN
Qty: 8 G | Refills: 0 | Status: SHIPPED | OUTPATIENT
Start: 2023-06-02 | End: 2024-06-01

## 2023-06-02 RX ORDER — ALBUTEROL SULFATE 2.5 MG/.5ML
2.5 SOLUTION RESPIRATORY (INHALATION) EVERY 4 HOURS PRN
Qty: 30 EACH | Refills: 0 | Status: SHIPPED | OUTPATIENT
Start: 2023-06-02 | End: 2024-06-01

## 2023-06-02 RX ORDER — PREDNISONE 20 MG/1
60 TABLET ORAL
Status: COMPLETED | OUTPATIENT
Start: 2023-06-02 | End: 2023-06-02

## 2023-06-02 RX ORDER — LORATADINE 10 MG/1
10 TABLET ORAL DAILY
Qty: 60 TABLET | Refills: 0 | Status: SHIPPED | OUTPATIENT
Start: 2023-06-02 | End: 2024-06-01

## 2023-06-02 RX ORDER — IPRATROPIUM BROMIDE AND ALBUTEROL SULFATE 2.5; .5 MG/3ML; MG/3ML
3 SOLUTION RESPIRATORY (INHALATION)
Status: COMPLETED | OUTPATIENT
Start: 2023-06-02 | End: 2023-06-02

## 2023-06-02 RX ORDER — FLUTICASONE PROPIONATE 50 MCG
1 SPRAY, SUSPENSION (ML) NASAL 2 TIMES DAILY
Qty: 16 G | Refills: 0 | Status: SHIPPED | OUTPATIENT
Start: 2023-06-02

## 2023-06-02 RX ORDER — PREDNISONE 20 MG/1
40 TABLET ORAL DAILY
Qty: 10 TABLET | Refills: 0 | Status: SHIPPED | OUTPATIENT
Start: 2023-06-02 | End: 2023-06-07

## 2023-06-02 RX ADMIN — PREDNISONE 60 MG: 20 TABLET ORAL at 05:06

## 2023-06-02 RX ADMIN — IPRATROPIUM BROMIDE AND ALBUTEROL SULFATE 3 ML: .5; 3 SOLUTION RESPIRATORY (INHALATION) at 05:06

## 2023-06-02 NOTE — Clinical Note
"Raymond"Eric Duarte was seen and treated in our emergency department on 6/2/2023.  He may return to work on 06/04/2023.       If you have any questions or concerns, please don't hesitate to call.      Katia Powell, DO"

## 2023-06-02 NOTE — ED PROVIDER NOTES
Encounter Date: 6/2/2023    SCRIBE #1 NOTE: I, SERG Ewing, am scribing for, and in the presence of,  Katia Powell DO. I have scribed the following portions of the note - Other sections scribed: HPI, ROS, PE, MDM.     History     Chief Complaint   Patient presents with    Asthma Attack     C/o asthma attack nightly for the past 3 days. Pt denies using inhaler. Pt reports mild wheezing. 95% on RA. No distress noted.     Raymond Duarte is a 23 y.o. male, with a PMHx of Asthma, who presents to the ED with SOB/wheezing which started 3 days ago. Patient reports he has been waking up in the middle of the night with SOB that feels similar to previous asthma exacerbations. Pt endorses regular use of inhaler. He has a nebulizer machine at home, but does not have any refills for it. He also takes cetirizine daily, but does not have any refills currently. No other exacerbating or alleviating factors. Denies fever, chills, chest pain, nausea, vomiting, or other associated symptoms.    The history is provided by the patient. No  was used.   Review of patient's allergies indicates:  No Known Allergies  Past Medical History:   Diagnosis Date    Asthma     Seizures      History reviewed. No pertinent surgical history.  History reviewed. No pertinent family history.  Social History     Tobacco Use    Smoking status: Never    Smokeless tobacco: Never   Substance Use Topics    Alcohol use: Never    Drug use: Never     Review of Systems   Constitutional:  Negative for chills and fever.   HENT:  Negative for rhinorrhea and sore throat.    Respiratory:  Positive for shortness of breath and wheezing.    Cardiovascular:  Negative for chest pain and leg swelling.   Gastrointestinal:  Negative for nausea and vomiting.   Skin:  Negative for rash.   Neurological:  Negative for numbness.   All other systems reviewed and are negative.    Physical Exam     Patient gave consent to have physical exam performed.  Initial  Vitals [06/02/23 1550]   BP Pulse Resp Temp SpO2   126/82 109 19 98.6 °F (37 °C) 95 %      MAP       --         Physical Exam    Nursing note and vitals reviewed.  Constitutional: He appears well-developed and well-nourished.   HENT:   Head: Normocephalic and atraumatic.   Right Ear: External ear normal.   Left Ear: External ear normal.   Nose: Nose normal.   Mouth/Throat: Oropharynx is clear and moist. No oropharyngeal exudate, posterior oropharyngeal edema or posterior oropharyngeal erythema.   Clear nasal discharge. Postnasal drip.   Eyes: Conjunctivae and EOM are normal. Pupils are equal, round, and reactive to light.   Bilateral allergic shiners   Neck: Neck supple.   Normal range of motion.  Cardiovascular:  Normal rate, regular rhythm and normal heart sounds.     Exam reveals no gallop and no friction rub.       No murmur heard.  Pulmonary/Chest: No respiratory distress. He has decreased breath sounds. He has no wheezes. He has no rhonchi. He has no rales.   Abdominal: Abdomen is soft. Bowel sounds are normal. There is no abdominal tenderness. There is no rebound and no guarding.   Musculoskeletal:         General: No tenderness or edema. Normal range of motion.      Cervical back: Normal range of motion and neck supple.     Neurological: He is alert and oriented to person, place, and time. No cranial nerve deficit.   Skin: Skin is warm and dry. Capillary refill takes less than 2 seconds. No rash noted.   Psychiatric: He has a normal mood and affect. His behavior is normal.       ED Course   Procedures  Labs Reviewed - No data to display       Imaging Results    None          Medications   albuterol-ipratropium 2.5 mg-0.5 mg/3 mL nebulizer solution 3 mL (3 mLs Nebulization Given 6/2/23 1716)   predniSONE tablet 60 mg (60 mg Oral Given 6/2/23 1715)     Medical Decision Making:   History:   Old Medical Records: I decided to obtain old medical records.                      Medical Decision Making:    This is an  evaluation of a 23 y.o. male that presents to the Emergency Department for   Chief Complaint   Patient presents with    Asthma Attack     C/o asthma attack nightly for the past 3 days. Pt denies using inhaler. Pt reports mild wheezing. 95% on RA. No distress noted.     The patient is a non-toxic and well appearing patient. On physical exam, patient appears well hydrated with moist mucus membranes. Regular rate and rhythm. No murmurs. Abdomen soft and non tender. Patient is tolerating PO without difficulty.  Vital Signs Are Reassuring.     Based on the patient's symptoms, I am considering and evaluating for the following differential diagnoses: asthma, asthma exacerbation, seasonal allergies, postnasal drip.    ED Course:Treatment in the ED included Physical Exam and medications given in ED  Medications   albuterol-ipratropium 2.5 mg-0.5 mg/3 mL nebulizer solution 3 mL (3 mLs Nebulization Given 6/2/23 1716)   predniSONE tablet 60 mg (60 mg Oral Given 6/2/23 1715)   .   Patient reports feeling better after treatment in the ER.     Risk  Diagnosis or treatment significantly limited by the following social determinants of health: Body mass index is 21.85 kg/m².     In shared decision making with the patient, we discussed treatment, prescriptions, labs, and imaging results.    Discharge home with   ED Prescriptions       Medication Sig Dispense Start Date End Date Auth. Provider    loratadine (CLARITIN) 10 mg tablet Take 1 tablet (10 mg total) by mouth once daily. 60 tablet 6/2/2023 6/1/2024 Katia oPwell DO    fluticasone propionate (FLONASE) 50 mcg/actuation nasal spray 1 spray (50 mcg total) by Each Nostril route 2 (two) times daily. 16 g 6/2/2023 -- Katia Powell,     predniSONE (DELTASONE) 20 MG tablet Take 2 tablets (40 mg total) by mouth once daily. for 5 days 10 tablet 6/2/2023 6/7/2023 Katia Powell DO    albuterol (PROVENTIL/VENTOLIN HFA) 90 mcg/actuation inhaler Inhale 2 puffs into the lungs every 6 (six) hours  as needed for Wheezing. Use with spacer  Dispense with 1 spacer 8 g 6/2/2023 6/1/2024 Katia Powell, DO    albuterol sulfate 2.5 mg/0.5 mL Nebu Take 2.5 mg by nebulization every 4 (four) hours as needed (As needed for shortness of breath). Rescue 30 each 6/2/2023 6/1/2024 Katia Powell DO          Fill and take prescriptions as directed.  Return to the ED if symptoms worsen or do not resolve.   Answered questions and discussed discharge plan.    Patient feels better and is ready for discharge.  Follow up with PCP/specialist in 1 day    The following labs and imaging were reviewed:    No visits with results within 1 Day(s) from this visit.   Latest known visit with results is:   Admission on 05/01/2023, Discharged on 05/01/2023   Component Date Value Ref Range Status    POC Rapid COVID 05/01/2023 Positive (A)  Negative Final     Acceptable 05/01/2023 Yes   Final    POC Rapid Strep A 05/01/2023 negative  Positive/Negative Final    Influenza B Ag 05/01/2023 negative  Positive/Negative Final    Inflenza A Ag 05/01/2023 negative  Positive/Negative Final    Albumin, POC 05/01/2023 5.4  3.3 - 5.5 g/dL Final    Alkaline Phosphatase, POC 05/01/2023 100  42 - 141 U/L Final    ALT (SGPT), POC 05/01/2023 25  10 - 47 U/L Final    AST (SGOT), POC 05/01/2023 37  11 - 38 U/L Final    POC BUN 05/01/2023 8  7 - 22 mg/dL Final    Calcium, POC 05/01/2023 10.5 (H)  8.0 - 10.3 mg/dL Final    POC Chloride 05/01/2023 108  98 - 108 mmol/L Final    POC Creatinine 05/01/2023 1.1  0.6 - 1.2 mg/dL Final    POC Glucose 05/01/2023 83  73 - 118 mg/dL Final    POC Potassium 05/01/2023 4.6  3.6 - 5.1 mmol/L Final    POC Sodium 05/01/2023 140  128 - 145 mmol/L Final    Bilirubin, POC 05/01/2023 1.3  0.2 - 1.6 mg/dL Final    POC TCO2 05/01/2023 27  18 - 33 mmol/L Final    Protein, POC 05/01/2023 8.5 (H)  6.4 - 8.1 g/dL Final    Albumin, POC 05/01/2023 5.6  3.3 - 5.5 g/dL Final    Alkaline Phosphatase, POC 05/01/2023 100  42 - 141 U/L Final     ALT (SGPT), POC 05/01/2023 21  10 - 47 U/L Final    Amylase, POC 05/01/2023 87  14 - 97 U/L Final    AST (SGOT), POC 05/01/2023 35  11 - 38 U/L Final    POC GGT 05/01/2023 16  5 - 65 U/L Final    Bilirubin, POC 05/01/2023 1.4  0.2 - 1.6 mg/dL Final    Protein, POC 05/01/2023 8.3 (H)  6.4 - 8.1 g/dL Final    Glucose, UA 05/01/2023 Negative   Final    Bilirubin, UA 05/01/2023 Negative   Final    Ketones, UA 05/01/2023 1+ (A)   Final    Spec Grav UA 05/01/2023 1.020   Final    Blood, UA 05/01/2023 Negative   Final    PH, UA 05/01/2023 7.5   Final    Protein, UA 05/01/2023 Trace (A)   Final    Urobilinogen, UA 05/01/2023 4.0 (A)  E.U./dL Final    Nitrite, UA 05/01/2023 Negative   Final    Leukocytes, UA 05/01/2023 Negative   Final    Color, UA 05/01/2023 Yellow   Final    Clarity, UA 05/01/2023 Clear   Final        Imaging Results    None        I, Dr. Katia Powell, personally performed the services described in this documentation. This document was produced by a scribe under my direction and in my presence. All medical record entries made by the scribe were at my direction and in my presence.  I have reviewed the chart and agree that the record reflects my personal performance and is accurate and complete. Katia Powell DO.     06/02/2023 5:42 PM    Clinical Impression:   Final diagnoses:  [J45.901] Exacerbation of asthma, unspecified asthma severity, unspecified whether persistent (Primary)  [J30.2] Seasonal allergies        ED Disposition Condition    Discharge Stable          ED Prescriptions       Medication Sig Dispense Start Date End Date Auth. Provider    loratadine (CLARITIN) 10 mg tablet Take 1 tablet (10 mg total) by mouth once daily. 60 tablet 6/2/2023 6/1/2024 Katia Powell DO    fluticasone propionate (FLONASE) 50 mcg/actuation nasal spray 1 spray (50 mcg total) by Each Nostril route 2 (two) times daily. 16 g 6/2/2023 -- Katia Powell DO    predniSONE (DELTASONE) 20 MG tablet Take 2 tablets (40  mg total) by mouth once daily. for 5 days 10 tablet 6/2/2023 6/7/2023 Katia Powell,     albuterol (PROVENTIL/VENTOLIN HFA) 90 mcg/actuation inhaler Inhale 2 puffs into the lungs every 6 (six) hours as needed for Wheezing. Use with spacer  Dispense with 1 spacer 8 g 6/2/2023 6/1/2024 Katia Powell DO    albuterol sulfate 2.5 mg/0.5 mL Nebu Take 2.5 mg by nebulization every 4 (four) hours as needed (As needed for shortness of breath). Rescue 30 each 6/2/2023 6/1/2024 Katia Powell DO          Follow-up Information    None          Katia Powell DO  06/02/23 0419

## 2023-09-11 ENCOUNTER — HOSPITAL ENCOUNTER (EMERGENCY)
Facility: HOSPITAL | Age: 23
Discharge: HOME | End: 2023-09-11
Attending: EMERGENCY MEDICINE

## 2023-09-11 VITALS
DIASTOLIC BLOOD PRESSURE: 76 MMHG | BODY MASS INDEX: 23.1 KG/M2 | TEMPERATURE: 98 F | OXYGEN SATURATION: 97 % | HEIGHT: 74 IN | WEIGHT: 180 LBS | HEART RATE: 76 BPM | RESPIRATION RATE: 18 BRPM | SYSTOLIC BLOOD PRESSURE: 127 MMHG

## 2023-09-11 DIAGNOSIS — G40.909 SEIZURE DISORDER: ICD-10-CM

## 2023-09-11 DIAGNOSIS — Z76.0 MEDICATION REFILL: Primary | ICD-10-CM

## 2023-09-11 PROCEDURE — 25000003 PHARM REV CODE 250: Mod: ER | Performed by: EMERGENCY MEDICINE

## 2023-09-11 PROCEDURE — 99283 EMERGENCY DEPT VISIT LOW MDM: CPT | Mod: ER

## 2023-09-11 RX ORDER — LEVETIRACETAM 750 MG/1
750 TABLET, EXTENDED RELEASE ORAL DAILY
Qty: 30 TABLET | Refills: 2 | Status: SHIPPED | OUTPATIENT
Start: 2023-09-11 | End: 2023-12-10

## 2023-09-11 RX ORDER — LEVETIRACETAM 500 MG/1
1000 TABLET ORAL
Status: COMPLETED | OUTPATIENT
Start: 2023-09-11 | End: 2023-09-11

## 2023-09-11 RX ADMIN — LEVETIRACETAM 1000 MG: 500 TABLET, FILM COATED ORAL at 04:09

## 2023-09-11 NOTE — DISCHARGE INSTRUCTIONS
YOU MUST NOT DRIVE UNTIL CLEARED BY NEUROLOGY, OPERATE HEAVY MACHINERY UNTIL CLEARED BY NEUROLOGY, YOU MUST NOT: SWIM, WATCH CHILDREN UNATTENDED, TAKE BATHS, CLIMB UP TO HEIGHT (LADDER/TREE ETC), OR ENGAGE IN ANY ACTIVITY WHICH COULD BE POTENTIALLY FATAL OR POTENTIALLY HARMFUL TO YOU OR THOSE AROUND YOU IF YOU WERE TO SEIZE WHILE PERFORMING THEM. DO NOT USE SHARP OBJECTS. DO NOT USE TRAMADOL AS IT MAY CAUSE YOU TO HAVE MORE SEIZURES.      Thank you for coming to our Emergency Department today. It is important to remember that some problems or medical conditions are difficult to diagnose and may not be found or addressed during your Emergency Department visit.  These conditions often start with non-specific symptoms and can only be diagnosed on follow up visits with your primary care physician or specialist when the symptoms continue or change. Please remember that all medical conditions can change, and we cannot predict how you will be feeling tomorrow or the next day.    Return to the ER with any questions/concerns, new/concerning symptoms, worsening, failure to improve or inability to obtain follow-up.       Be sure to follow up with your primary care doctor and review all labs/imaging/tests that were performed during your ER visit with them. It is very common for us to identify non-emergent incidental findings which must be followed up with your primary care physician.  Some labs/imaging/tests may be outside of the normal range, and require non-emergent follow-up and/or further investigation/treatment/procedures/testing to help diagnose/exclude/prevent complications or other potentially serious medical conditions. Some abnormalities may not have been discussed or addressed during your ER visit. Some lab results may not return during your ER visit but can be accessible by downloading the free [facility]andrika or by visiting [email or web address] . It is important for you to review all labs/imaging/tests which  are outside of the normal range with your physician.    An ER visit does not replace a primary care visit, and many screening tests or follow-up tests cannot be ordered by an ER doctor or performed by the ER. Some tests may even require pre-approval.    If you do not have a primary care doctor, you may contact the one listed on your discharge paperwork or you may also call the [facility] Appointment Desk at [phone] , or [facility] at  [phone] to schedule an appointment, or establish care with a primary care doctor or even a specialist and to obtain information about local resources. It is important to your health that you have a primary care doctor.    Please take all medications as directed. We have done our best to select a medication for you that will treat your condition however, all medications may potentially have side-effects and it is impossible to predict which medications may give you side-effects or what those side-effects (if any) those medications may give you.  If you feel that you are having a negative effect or side-effect of any medication you should stop taking those medications immediately and seek medical attention. If you feel that you are having a life-threatening reaction call 911.        Do not drive, swim, climb to height, take a bath, operate heavy machinery, drink alcohol or take potentially sedating medications, sign any legal documents or make any important decisions for 24 hours if you have received any pain medications, sedatives or mood altering drugs during your ER visit or within 24 hours of taking them if they have been prescribed to you.     You can find additional resources for Dentists, hearing aids, durable medical equipment, low cost pharmacies and other resources at [email or web address]

## 2023-09-11 NOTE — ED PROVIDER NOTES
Encounter Date: 9/11/2023       History     Chief Complaint   Patient presents with    Medication Refill     A 22 y/o male presents to the ER needing a prescription refill. Pt has Hx of epilepsy and currently out of medication. Pt does not currently have a provider to fill medication. Denies Any symptoms.     23 y.o. male Past Medical History:  No date: Asthma  No date: Seizures     Notes that he typically takes Keppra 750mg XR daily, took his last tab yesterday. Denies complaints today, not currently followed by neuro.      Review of patient's allergies indicates:  No Known Allergies  Past Medical History:   Diagnosis Date    Asthma     Seizures      No past surgical history on file.  No family history on file.  Social History     Tobacco Use    Smoking status: Never    Smokeless tobacco: Never   Substance Use Topics    Alcohol use: Never    Drug use: Never     Review of Systems   Constitutional:  Negative for fever.   HENT:  Negative for sore throat.    Respiratory:  Negative for shortness of breath.    Cardiovascular:  Negative for chest pain.   Gastrointestinal:  Negative for nausea.   Genitourinary:  Negative for dysuria.   Musculoskeletal:  Negative for back pain.   Skin:  Negative for rash.   Neurological:  Negative for weakness.   Hematological:  Does not bruise/bleed easily.   All other systems reviewed and are negative.      Physical Exam     Initial Vitals [09/11/23 1621]   BP Pulse Resp Temp SpO2   127/76 76 18 98.2 °F (36.8 °C) 97 %      MAP       --         Physical Exam    Nursing note and vitals reviewed.  Constitutional: He appears well-developed and well-nourished.   HENT:   Head: Normocephalic and atraumatic.   Eyes: EOM are normal. Pupils are equal, round, and reactive to light.   Cardiovascular:  Normal rate and regular rhythm.           Pulmonary/Chest: Effort normal.   Abdominal: He exhibits no distension.   Musculoskeletal:         General: No tenderness or edema.     Neurological: He is  alert and oriented to person, place, and time. No cranial nerve deficit.   Skin: Skin is warm and dry.   Psychiatric: He has a normal mood and affect.         ED Course   Procedures  Labs Reviewed - No data to display       Imaging Results    None          Medications   levETIRAcetam tablet 1,000 mg (1,000 mg Oral Given 9/11/23 6703)     Medical Decision Making  Risk  Prescription drug management.                Will give a dose of keppra here and refill keppra 750 XR and refer to neuro. Will also give seizure precautions instructions.               Clinical Impression:   Final diagnoses:  [Z76.0] Medication refill (Primary)  [G40.909] Seizure disorder        ED Disposition Condition    Discharge Stable          ED Prescriptions       Medication Sig Dispense Start Date End Date Auth. Provider    levetiracetam XR (KEPPRA XR) 750 mg Tb24 tablet Take 1 tablet (750 mg total) by mouth Daily. 30 tablet 9/11/2023 12/10/2023 Opal Cisneros MD          Follow-up Information    None          Opal Cisneros MD  09/11/23 2107

## 2024-04-06 ENCOUNTER — HOSPITAL ENCOUNTER (INPATIENT)
Facility: HOSPITAL | Age: 24
LOS: 2 days | Discharge: HOME | DRG: 202 | End: 2024-04-09
Attending: EMERGENCY MEDICINE | Admitting: INTERNAL MEDICINE

## 2024-04-06 DIAGNOSIS — J45.901 ASTHMA WITH ACUTE EXACERBATION, UNSPECIFIED ASTHMA SEVERITY, UNSPECIFIED WHETHER PERSISTENT: ICD-10-CM

## 2024-04-06 DIAGNOSIS — J96.01 ACUTE RESPIRATORY FAILURE WITH HYPOXIA AND HYPERCAPNIA: Primary | ICD-10-CM

## 2024-04-06 DIAGNOSIS — R06.02 SOB (SHORTNESS OF BREATH): ICD-10-CM

## 2024-04-06 DIAGNOSIS — J96.02 ACUTE RESPIRATORY FAILURE WITH HYPOXIA AND HYPERCAPNIA: Primary | ICD-10-CM

## 2024-04-06 DIAGNOSIS — J45.901 ASTHMA EXACERBATION: ICD-10-CM

## 2024-04-06 LAB
ALBUMIN SERPL-MCNC: 4.7 G/DL (ref 3.3–5.5)
ALLENS TEST: ABNORMAL
ALLENS TEST: ABNORMAL
ALP SERPL-CCNC: 106 U/L (ref 42–141)
BILIRUB SERPL-MCNC: 0.9 MG/DL (ref 0.2–1.6)
BILIRUBIN, POC UA: NEGATIVE
BLOOD, POC UA: ABNORMAL
BUN SERPL-MCNC: 10 MG/DL (ref 7–22)
CALCIUM SERPL-MCNC: 10.1 MG/DL (ref 8–10.3)
CHLORIDE SERPL-SCNC: 104 MMOL/L (ref 98–108)
CLARITY, POC UA: CLEAR
COLOR, POC UA: YELLOW
CREAT SERPL-MCNC: 1 MG/DL (ref 0.6–1.2)
CTP QC/QA: YES
DELSYS: ABNORMAL
DELSYS: ABNORMAL
EP: 6
EP: 6
ERYTHROCYTE [SEDIMENTATION RATE] IN BLOOD BY WESTERGREN METHOD: 12 MM/H
ERYTHROCYTE [SEDIMENTATION RATE] IN BLOOD BY WESTERGREN METHOD: 12 MM/H
FIO2: 35
FIO2: 60
GLUCOSE SERPL-MCNC: 102 MG/DL (ref 73–118)
GLUCOSE, POC UA: NEGATIVE
HCO3 UR-SCNC: 27.5 MMOL/L (ref 24–28)
HCO3 UR-SCNC: 30.9 MMOL/L (ref 24–28)
HCT, POC: NORMAL
HGB, POC: NORMAL (ref 14–18)
INFLUENZA A ANTIGEN, POC: NEGATIVE
INFLUENZA B ANTIGEN, POC: NEGATIVE
IP: 12
IP: 12
KETONES, POC UA: ABNORMAL
LDH SERPL L TO P-CCNC: 0.64 MMOL/L (ref 0.36–1.25)
LDH SERPL L TO P-CCNC: 1.15 MMOL/L (ref 0.5–2.2)
LEUKOCYTE EST, POC UA: NEGATIVE
MCH, POC: NORMAL
MCHC, POC: NORMAL
MCV, POC: NORMAL
MIN VOL: 13
MODE: ABNORMAL
MODE: ABNORMAL
MPV, POC: NORMAL
NITRITE, POC UA: NEGATIVE
PCO2 BLDA: 55.9 MMHG (ref 35–45)
PCO2 BLDA: 70.4 MMHG (ref 35–45)
PH SMN: 7.25 [PH] (ref 7.35–7.45)
PH SMN: 7.3 [PH] (ref 7.35–7.45)
PH UR STRIP: 5.5 [PH]
PO2 BLDA: 172 MMHG (ref 80–100)
PO2 BLDA: 54 MMHG (ref 40–60)
POC ALT (SGPT): 15 U/L (ref 10–47)
POC AST (SGOT): 29 U/L (ref 11–38)
POC B-TYPE NATRIURETIC PEPTIDE: 6.4 PG/ML (ref 0–100)
POC BE: 0 MMOL/L
POC BE: 1 MMOL/L
POC CARDIAC TROPONIN I: 0 NG/ML (ref 0–0.08)
POC D-DI: <100 NG/ML (ref 0–450)
POC PLATELET COUNT: NORMAL
POC SATURATED O2: 81 % (ref 95–100)
POC SATURATED O2: 99 % (ref 95–100)
POC TCO2: 29 MMOL/L (ref 23–27)
POC TCO2: 30 MMOL/L (ref 18–33)
POC TCO2: 33 MMOL/L (ref 24–29)
POTASSIUM BLD-SCNC: 4.2 MMOL/L (ref 3.6–5.1)
PROTEIN, POC UA: NEGATIVE
PROTEIN, POC: 8.1 G/DL (ref 6.4–8.1)
RBC, POC: NORMAL
RDW, POC: NORMAL
SAMPLE: ABNORMAL
SAMPLE: ABNORMAL
SAMPLE: NORMAL
SARS-COV-2 RDRP RESP QL NAA+PROBE: NEGATIVE
SITE: ABNORMAL
SITE: ABNORMAL
SODIUM BLD-SCNC: 146 MMOL/L (ref 128–145)
SP02: 100
SP02: 99
SPECIFIC GRAVITY, POC UA: >=1.03
SPONT RATE: 18
SPONT RATE: 20
UROBILINOGEN, POC UA: 0.2 E.U./DL
WBC, POC: NORMAL

## 2024-04-06 PROCEDURE — 25000242 PHARM REV CODE 250 ALT 637 W/ HCPCS: Mod: ER | Performed by: EMERGENCY MEDICINE

## 2024-04-06 PROCEDURE — 5A09357 ASSISTANCE WITH RESPIRATORY VENTILATION, LESS THAN 24 CONSECUTIVE HOURS, CONTINUOUS POSITIVE AIRWAY PRESSURE: ICD-10-PCS | Performed by: STUDENT IN AN ORGANIZED HEALTH CARE EDUCATION/TRAINING PROGRAM

## 2024-04-06 PROCEDURE — 83880 ASSAY OF NATRIURETIC PEPTIDE: CPT | Mod: ER

## 2024-04-06 PROCEDURE — 94660 CPAP INITIATION&MGMT: CPT | Mod: ER

## 2024-04-06 PROCEDURE — 85379 FIBRIN DEGRADATION QUANT: CPT | Mod: ER

## 2024-04-06 PROCEDURE — 93010 ELECTROCARDIOGRAM REPORT: CPT | Mod: ,,, | Performed by: INTERNAL MEDICINE

## 2024-04-06 PROCEDURE — 82803 BLOOD GASES ANY COMBINATION: CPT | Mod: ER

## 2024-04-06 PROCEDURE — 27000190 HC CPAP FULL FACE MASK W/VALVE: Mod: ER

## 2024-04-06 PROCEDURE — 96372 THER/PROPH/DIAG INJ SC/IM: CPT | Performed by: EMERGENCY MEDICINE

## 2024-04-06 PROCEDURE — 99900035 HC TECH TIME PER 15 MIN (STAT): Mod: ER

## 2024-04-06 PROCEDURE — 25000003 PHARM REV CODE 250: Mod: ER | Performed by: NURSE PRACTITIONER

## 2024-04-06 PROCEDURE — 36600 WITHDRAWAL OF ARTERIAL BLOOD: CPT | Mod: ER

## 2024-04-06 PROCEDURE — 99291 CRITICAL CARE FIRST HOUR: CPT | Mod: ER

## 2024-04-06 PROCEDURE — 87635 SARS-COV-2 COVID-19 AMP PRB: CPT | Mod: ER | Performed by: EMERGENCY MEDICINE

## 2024-04-06 PROCEDURE — 80053 COMPREHEN METABOLIC PANEL: CPT | Mod: ER

## 2024-04-06 PROCEDURE — 96361 HYDRATE IV INFUSION ADD-ON: CPT | Mod: ER

## 2024-04-06 PROCEDURE — 81003 URINALYSIS AUTO W/O SCOPE: CPT | Mod: ER

## 2024-04-06 PROCEDURE — 85025 COMPLETE CBC W/AUTO DIFF WBC: CPT | Mod: ER

## 2024-04-06 PROCEDURE — 93005 ELECTROCARDIOGRAM TRACING: CPT | Mod: ER

## 2024-04-06 PROCEDURE — 84484 ASSAY OF TROPONIN QUANT: CPT | Mod: ER

## 2024-04-06 PROCEDURE — 87502 INFLUENZA DNA AMP PROBE: CPT | Mod: ER

## 2024-04-06 PROCEDURE — 94640 AIRWAY INHALATION TREATMENT: CPT | Mod: ER

## 2024-04-06 PROCEDURE — 63600175 PHARM REV CODE 636 W HCPCS: Mod: ER | Performed by: EMERGENCY MEDICINE

## 2024-04-06 PROCEDURE — 96365 THER/PROPH/DIAG IV INF INIT: CPT | Mod: ER

## 2024-04-06 RX ORDER — METHYLPREDNISOLONE SOD SUCC 125 MG
125 VIAL (EA) INJECTION
Status: COMPLETED | OUTPATIENT
Start: 2024-04-06 | End: 2024-04-06

## 2024-04-06 RX ORDER — DEXAMETHASONE SODIUM PHOSPHATE 4 MG/ML
18 INJECTION, SOLUTION INTRA-ARTICULAR; INTRALESIONAL; INTRAMUSCULAR; INTRAVENOUS; SOFT TISSUE
Status: DISCONTINUED | OUTPATIENT
Start: 2024-04-06 | End: 2024-04-06

## 2024-04-06 RX ORDER — MAGNESIUM SULFATE HEPTAHYDRATE 40 MG/ML
2 INJECTION, SOLUTION INTRAVENOUS
Status: DISCONTINUED | OUTPATIENT
Start: 2024-04-06 | End: 2024-04-06

## 2024-04-06 RX ORDER — ALBUTEROL SULFATE 2.5 MG/.5ML
10 SOLUTION RESPIRATORY (INHALATION)
Status: COMPLETED | OUTPATIENT
Start: 2024-04-06 | End: 2024-04-06

## 2024-04-06 RX ORDER — MAGNESIUM SULFATE HEPTAHYDRATE 40 MG/ML
2 INJECTION, SOLUTION INTRAVENOUS
Status: COMPLETED | OUTPATIENT
Start: 2024-04-06 | End: 2024-04-06

## 2024-04-06 RX ORDER — ALBUTEROL SULFATE 2.5 MG/.5ML
2.5 SOLUTION RESPIRATORY (INHALATION)
Status: DISCONTINUED | OUTPATIENT
Start: 2024-04-06 | End: 2024-04-06

## 2024-04-06 RX ADMIN — ALBUTEROL SULFATE 10 MG: 2.5 SOLUTION RESPIRATORY (INHALATION) at 09:04

## 2024-04-06 RX ADMIN — SODIUM CHLORIDE 1000 ML: 9 INJECTION, SOLUTION INTRAVENOUS at 09:04

## 2024-04-06 RX ADMIN — MAGNESIUM SULFATE HEPTAHYDRATE 2 G: 2 INJECTION, SOLUTION INTRAVENOUS at 08:04

## 2024-04-06 RX ADMIN — METHYLPREDNISOLONE SODIUM SUCCINATE 125 MG: 125 INJECTION, POWDER, FOR SOLUTION INTRAMUSCULAR; INTRAVENOUS at 08:04

## 2024-04-06 NOTE — Clinical Note
"Raymond Guillermoramon Duarte was seen and treated in our emergency department on 4/6/2024.  He may return to work on 04/12/2024.       If you have any questions or concerns, please don't hesitate to call.      Romulo NICOLE    "

## 2024-04-07 PROBLEM — G40.909 SEIZURE DISORDER: Status: ACTIVE | Noted: 2024-04-07

## 2024-04-07 PROBLEM — J96.01 ACUTE RESPIRATORY FAILURE WITH HYPOXIA AND HYPERCAPNIA: Status: ACTIVE | Noted: 2024-04-07

## 2024-04-07 PROBLEM — J96.02 ACUTE RESPIRATORY FAILURE WITH HYPOXIA AND HYPERCAPNIA: Status: ACTIVE | Noted: 2024-04-07

## 2024-04-07 PROBLEM — J45.901 ASTHMA EXACERBATION: Status: ACTIVE | Noted: 2021-07-17

## 2024-04-07 LAB
ALBUMIN SERPL BCP-MCNC: 4.4 G/DL (ref 3.5–5.2)
ALLENS TEST: ABNORMAL
ALP SERPL-CCNC: 103 U/L (ref 55–135)
ALT SERPL W/O P-5'-P-CCNC: 15 U/L (ref 10–44)
ANION GAP SERPL CALC-SCNC: 12 MMOL/L (ref 8–16)
AST SERPL-CCNC: 23 U/L (ref 10–40)
BASOPHILS # BLD AUTO: 0.01 K/UL (ref 0–0.2)
BASOPHILS NFR BLD: 0.1 % (ref 0–1.9)
BILIRUB SERPL-MCNC: 0.9 MG/DL (ref 0.1–1)
BUN SERPL-MCNC: 9 MG/DL (ref 6–20)
CALCIUM SERPL-MCNC: 9.8 MG/DL (ref 8.7–10.5)
CHLORIDE SERPL-SCNC: 103 MMOL/L (ref 95–110)
CO2 SERPL-SCNC: 23 MMOL/L (ref 23–29)
CREAT SERPL-MCNC: 1 MG/DL (ref 0.5–1.4)
DELSYS: ABNORMAL
DIFFERENTIAL METHOD BLD: ABNORMAL
EOSINOPHIL # BLD AUTO: 0 K/UL (ref 0–0.5)
EOSINOPHIL NFR BLD: 0 % (ref 0–8)
EP: 6
EP: 8
EP: 8
ERYTHROCYTE [DISTWIDTH] IN BLOOD BY AUTOMATED COUNT: 11.4 % (ref 11.5–14.5)
ERYTHROCYTE [SEDIMENTATION RATE] IN BLOOD BY WESTERGREN METHOD: 12 MM/H
ERYTHROCYTE [SEDIMENTATION RATE] IN BLOOD BY WESTERGREN METHOD: 14 MM/H
EST. GFR  (NO RACE VARIABLE): >60 ML/MIN/1.73 M^2
FIO2: 30
FIO2: 35
GLUCOSE SERPL-MCNC: 186 MG/DL (ref 70–110)
HCO3 UR-SCNC: 26.1 MMOL/L (ref 24–28)
HCO3 UR-SCNC: 26.7 MMOL/L (ref 24–28)
HCO3 UR-SCNC: 27.5 MMOL/L (ref 24–28)
HCO3 UR-SCNC: 27.6 MMOL/L (ref 24–28)
HCO3 UR-SCNC: 32.9 MMOL/L (ref 24–28)
HCT VFR BLD AUTO: 45.9 % (ref 40–54)
HGB BLD-MCNC: 14.8 G/DL (ref 14–18)
IMM GRANULOCYTES # BLD AUTO: 0.06 K/UL (ref 0–0.04)
IMM GRANULOCYTES NFR BLD AUTO: 0.6 % (ref 0–0.5)
IP: 12
IP: 18
IP: 18
LDH SERPL L TO P-CCNC: 0.97 MMOL/L (ref 0.5–2.2)
LYMPHOCYTES # BLD AUTO: 0.7 K/UL (ref 1–4.8)
LYMPHOCYTES NFR BLD: 6.8 % (ref 18–48)
MAGNESIUM SERPL-MCNC: 2.1 MG/DL (ref 1.6–2.6)
MCH RBC QN AUTO: 28.7 PG (ref 27–31)
MCHC RBC AUTO-ENTMCNC: 32.2 G/DL (ref 32–36)
MCV RBC AUTO: 89 FL (ref 82–98)
MODE: ABNORMAL
MONOCYTES # BLD AUTO: 0.1 K/UL (ref 0.3–1)
MONOCYTES NFR BLD: 1.3 % (ref 4–15)
NEUTROPHILS # BLD AUTO: 9.8 K/UL (ref 1.8–7.7)
NEUTROPHILS NFR BLD: 91.2 % (ref 38–73)
NRBC BLD-RTO: 0 /100 WBC
PCO2 BLDA: 43.8 MMHG (ref 35–45)
PCO2 BLDA: 46.2 MMHG (ref 35–45)
PCO2 BLDA: 57.1 MMHG (ref 35–45)
PCO2 BLDA: 60.3 MMHG (ref 35–45)
PCO2 BLDA: 69 MMHG (ref 35–45)
PH SMN: 7.27 [PH] (ref 7.35–7.45)
PH SMN: 7.29 [PH] (ref 7.35–7.45)
PH SMN: 7.29 [PH] (ref 7.35–7.45)
PH SMN: 7.37 [PH] (ref 7.35–7.45)
PH SMN: 7.38 [PH] (ref 7.35–7.45)
PHOSPHATE SERPL-MCNC: 2.8 MG/DL (ref 2.7–4.5)
PLATELET # BLD AUTO: 272 K/UL (ref 150–450)
PMV BLD AUTO: 9.1 FL (ref 9.2–12.9)
PO2 BLDA: 138 MMHG (ref 80–100)
PO2 BLDA: 148 MMHG (ref 80–100)
PO2 BLDA: 26 MMHG (ref 40–60)
PO2 BLDA: 34 MMHG (ref 40–60)
PO2 BLDA: 56 MMHG (ref 40–60)
POC BE: -1 MMOL/L
POC BE: 0 MMOL/L
POC BE: 1 MMOL/L
POC BE: 1 MMOL/L
POC BE: 4 MMOL/L
POC SATURATED O2: 39 % (ref 95–100)
POC SATURATED O2: 57 % (ref 95–100)
POC SATURATED O2: 84 % (ref 95–100)
POC SATURATED O2: 99 % (ref 95–100)
POC SATURATED O2: 99 % (ref 95–100)
POC TCO2: 27 MMOL/L (ref 23–27)
POC TCO2: 28 MMOL/L (ref 23–27)
POC TCO2: 29 MMOL/L (ref 24–29)
POC TCO2: 29 MMOL/L (ref 24–29)
POC TCO2: 35 MMOL/L (ref 24–29)
POTASSIUM SERPL-SCNC: 4.6 MMOL/L (ref 3.5–5.1)
PROT SERPL-MCNC: 7.5 G/DL (ref 6–8.4)
RBC # BLD AUTO: 5.15 M/UL (ref 4.6–6.2)
SAMPLE: ABNORMAL
SITE: ABNORMAL
SODIUM SERPL-SCNC: 138 MMOL/L (ref 136–145)
SP02: 100
SPONT RATE: 17
SPONT RATE: 17
SPONT RATE: 18
SPONT RATE: 20
WBC # BLD AUTO: 10.76 K/UL (ref 3.9–12.7)

## 2024-04-07 PROCEDURE — 99900035 HC TECH TIME PER 15 MIN (STAT): Mod: ER

## 2024-04-07 PROCEDURE — 94640 AIRWAY INHALATION TREATMENT: CPT

## 2024-04-07 PROCEDURE — 82800 BLOOD PH: CPT

## 2024-04-07 PROCEDURE — 20000000 HC ICU ROOM

## 2024-04-07 PROCEDURE — 25000242 PHARM REV CODE 250 ALT 637 W/ HCPCS: Performed by: STUDENT IN AN ORGANIZED HEALTH CARE EDUCATION/TRAINING PROGRAM

## 2024-04-07 PROCEDURE — 27000221 HC OXYGEN, UP TO 24 HOURS

## 2024-04-07 PROCEDURE — 85014 HEMATOCRIT: CPT

## 2024-04-07 PROCEDURE — 83735 ASSAY OF MAGNESIUM: CPT | Performed by: STUDENT IN AN ORGANIZED HEALTH CARE EDUCATION/TRAINING PROGRAM

## 2024-04-07 PROCEDURE — 25000242 PHARM REV CODE 250 ALT 637 W/ HCPCS: Mod: ER | Performed by: EMERGENCY MEDICINE

## 2024-04-07 PROCEDURE — 99900035 HC TECH TIME PER 15 MIN (STAT)

## 2024-04-07 PROCEDURE — 84132 ASSAY OF SERUM POTASSIUM: CPT

## 2024-04-07 PROCEDURE — 25000003 PHARM REV CODE 250: Performed by: STUDENT IN AN ORGANIZED HEALTH CARE EDUCATION/TRAINING PROGRAM

## 2024-04-07 PROCEDURE — 82803 BLOOD GASES ANY COMBINATION: CPT

## 2024-04-07 PROCEDURE — 94761 N-INVAS EAR/PLS OXIMETRY MLT: CPT | Mod: XB

## 2024-04-07 PROCEDURE — 94640 AIRWAY INHALATION TREATMENT: CPT | Mod: ER

## 2024-04-07 PROCEDURE — 36600 WITHDRAWAL OF ARTERIAL BLOOD: CPT

## 2024-04-07 PROCEDURE — 94660 CPAP INITIATION&MGMT: CPT

## 2024-04-07 PROCEDURE — 99291 CRITICAL CARE FIRST HOUR: CPT | Mod: ,,, | Performed by: INTERNAL MEDICINE

## 2024-04-07 PROCEDURE — 84100 ASSAY OF PHOSPHORUS: CPT | Performed by: STUDENT IN AN ORGANIZED HEALTH CARE EDUCATION/TRAINING PROGRAM

## 2024-04-07 PROCEDURE — 27000190 HC CPAP FULL FACE MASK W/VALVE

## 2024-04-07 PROCEDURE — 85025 COMPLETE CBC W/AUTO DIFF WBC: CPT | Performed by: STUDENT IN AN ORGANIZED HEALTH CARE EDUCATION/TRAINING PROGRAM

## 2024-04-07 PROCEDURE — 80053 COMPREHEN METABOLIC PANEL: CPT | Performed by: STUDENT IN AN ORGANIZED HEALTH CARE EDUCATION/TRAINING PROGRAM

## 2024-04-07 PROCEDURE — 84295 ASSAY OF SERUM SODIUM: CPT

## 2024-04-07 PROCEDURE — 36415 COLL VENOUS BLD VENIPUNCTURE: CPT | Performed by: STUDENT IN AN ORGANIZED HEALTH CARE EDUCATION/TRAINING PROGRAM

## 2024-04-07 PROCEDURE — 82330 ASSAY OF CALCIUM: CPT

## 2024-04-07 PROCEDURE — 63600175 PHARM REV CODE 636 W HCPCS: Performed by: INTERNAL MEDICINE

## 2024-04-07 PROCEDURE — 63600175 PHARM REV CODE 636 W HCPCS: Performed by: STUDENT IN AN ORGANIZED HEALTH CARE EDUCATION/TRAINING PROGRAM

## 2024-04-07 PROCEDURE — 27100171 HC OXYGEN HIGH FLOW UP TO 24 HOURS

## 2024-04-07 RX ORDER — POTASSIUM CHLORIDE 7.45 MG/ML
80 INJECTION INTRAVENOUS
Status: DISCONTINUED | OUTPATIENT
Start: 2024-04-07 | End: 2024-04-09 | Stop reason: HOSPADM

## 2024-04-07 RX ORDER — IPRATROPIUM BROMIDE AND ALBUTEROL SULFATE 2.5; .5 MG/3ML; MG/3ML
3 SOLUTION RESPIRATORY (INHALATION) EVERY 4 HOURS PRN
Status: DISCONTINUED | OUTPATIENT
Start: 2024-04-07 | End: 2024-04-08

## 2024-04-07 RX ORDER — LEVETIRACETAM 100 MG/ML
750 SOLUTION ORAL NIGHTLY
Status: DISCONTINUED | OUTPATIENT
Start: 2024-04-07 | End: 2024-04-07

## 2024-04-07 RX ORDER — CALCIUM GLUCONATE 20 MG/ML
1 INJECTION, SOLUTION INTRAVENOUS
Status: DISCONTINUED | OUTPATIENT
Start: 2024-04-07 | End: 2024-04-09 | Stop reason: HOSPADM

## 2024-04-07 RX ORDER — ALBUTEROL SULFATE 2.5 MG/.5ML
5 SOLUTION RESPIRATORY (INHALATION)
Status: COMPLETED | OUTPATIENT
Start: 2024-04-07 | End: 2024-04-07

## 2024-04-07 RX ORDER — POTASSIUM CHLORIDE 7.45 MG/ML
40 INJECTION INTRAVENOUS
Status: DISCONTINUED | OUTPATIENT
Start: 2024-04-07 | End: 2024-04-09 | Stop reason: HOSPADM

## 2024-04-07 RX ORDER — MUPIROCIN 20 MG/G
OINTMENT TOPICAL 2 TIMES DAILY
Status: DISCONTINUED | OUTPATIENT
Start: 2024-04-07 | End: 2024-04-09 | Stop reason: HOSPADM

## 2024-04-07 RX ORDER — FAMOTIDINE 10 MG/ML
20 INJECTION INTRAVENOUS DAILY
Status: DISCONTINUED | OUTPATIENT
Start: 2024-04-07 | End: 2024-04-08

## 2024-04-07 RX ORDER — MAGNESIUM SULFATE HEPTAHYDRATE 40 MG/ML
4 INJECTION, SOLUTION INTRAVENOUS
Status: DISCONTINUED | OUTPATIENT
Start: 2024-04-07 | End: 2024-04-09 | Stop reason: HOSPADM

## 2024-04-07 RX ORDER — POTASSIUM CHLORIDE 7.45 MG/ML
60 INJECTION INTRAVENOUS
Status: DISCONTINUED | OUTPATIENT
Start: 2024-04-07 | End: 2024-04-09 | Stop reason: HOSPADM

## 2024-04-07 RX ORDER — CETIRIZINE HYDROCHLORIDE 10 MG/1
10 TABLET ORAL DAILY PRN
COMMUNITY
Start: 2023-10-17

## 2024-04-07 RX ORDER — MAGNESIUM SULFATE HEPTAHYDRATE 40 MG/ML
2 INJECTION, SOLUTION INTRAVENOUS
Status: DISCONTINUED | OUTPATIENT
Start: 2024-04-07 | End: 2024-04-09 | Stop reason: HOSPADM

## 2024-04-07 RX ORDER — ENOXAPARIN SODIUM 100 MG/ML
40 INJECTION SUBCUTANEOUS EVERY 24 HOURS
Status: DISCONTINUED | OUTPATIENT
Start: 2024-04-07 | End: 2024-04-09 | Stop reason: HOSPADM

## 2024-04-07 RX ORDER — CALCIUM GLUCONATE 20 MG/ML
3 INJECTION, SOLUTION INTRAVENOUS
Status: DISCONTINUED | OUTPATIENT
Start: 2024-04-07 | End: 2024-04-09 | Stop reason: HOSPADM

## 2024-04-07 RX ORDER — PROCHLORPERAZINE EDISYLATE 5 MG/ML
5 INJECTION INTRAMUSCULAR; INTRAVENOUS EVERY 6 HOURS PRN
Status: DISCONTINUED | OUTPATIENT
Start: 2024-04-07 | End: 2024-04-09 | Stop reason: HOSPADM

## 2024-04-07 RX ORDER — LEVETIRACETAM 100 MG/ML
750 SOLUTION ORAL NIGHTLY
Status: DISCONTINUED | OUTPATIENT
Start: 2024-04-07 | End: 2024-04-09 | Stop reason: HOSPADM

## 2024-04-07 RX ORDER — TALC
6 POWDER (GRAM) TOPICAL NIGHTLY PRN
Status: DISCONTINUED | OUTPATIENT
Start: 2024-04-07 | End: 2024-04-09 | Stop reason: HOSPADM

## 2024-04-07 RX ORDER — IPRATROPIUM BROMIDE AND ALBUTEROL SULFATE 2.5; .5 MG/3ML; MG/3ML
3 SOLUTION RESPIRATORY (INHALATION) EVERY 4 HOURS
Status: DISCONTINUED | OUTPATIENT
Start: 2024-04-07 | End: 2024-04-08

## 2024-04-07 RX ORDER — CALCIUM GLUCONATE 20 MG/ML
2 INJECTION, SOLUTION INTRAVENOUS
Status: DISCONTINUED | OUTPATIENT
Start: 2024-04-07 | End: 2024-04-09 | Stop reason: HOSPADM

## 2024-04-07 RX ORDER — ACETAMINOPHEN 325 MG/1
650 TABLET ORAL EVERY 4 HOURS PRN
Status: DISCONTINUED | OUTPATIENT
Start: 2024-04-07 | End: 2024-04-09 | Stop reason: HOSPADM

## 2024-04-07 RX ORDER — SODIUM CHLORIDE 0.9 % (FLUSH) 0.9 %
10 SYRINGE (ML) INJECTION
Status: DISCONTINUED | OUTPATIENT
Start: 2024-04-07 | End: 2024-04-09 | Stop reason: HOSPADM

## 2024-04-07 RX ORDER — ONDANSETRON HYDROCHLORIDE 2 MG/ML
4 INJECTION, SOLUTION INTRAVENOUS EVERY 8 HOURS PRN
Status: DISCONTINUED | OUTPATIENT
Start: 2024-04-07 | End: 2024-04-09 | Stop reason: HOSPADM

## 2024-04-07 RX ADMIN — MUPIROCIN: 20 OINTMENT TOPICAL at 09:04

## 2024-04-07 RX ADMIN — METHYLPREDNISOLONE SODIUM SUCCINATE 40 MG: 40 INJECTION, POWDER, FOR SOLUTION INTRAMUSCULAR; INTRAVENOUS at 08:04

## 2024-04-07 RX ADMIN — METHYLPREDNISOLONE SODIUM SUCCINATE 40 MG: 40 INJECTION, POWDER, FOR SOLUTION INTRAMUSCULAR; INTRAVENOUS at 11:04

## 2024-04-07 RX ADMIN — IPRATROPIUM BROMIDE AND ALBUTEROL SULFATE 3 ML: 2.5; .5 SOLUTION RESPIRATORY (INHALATION) at 12:04

## 2024-04-07 RX ADMIN — FAMOTIDINE 20 MG: 10 INJECTION INTRAVENOUS at 08:04

## 2024-04-07 RX ADMIN — IPRATROPIUM BROMIDE AND ALBUTEROL SULFATE 3 ML: 2.5; .5 SOLUTION RESPIRATORY (INHALATION) at 07:04

## 2024-04-07 RX ADMIN — METHYLPREDNISOLONE SODIUM SUCCINATE 40 MG: 40 INJECTION, POWDER, FOR SOLUTION INTRAMUSCULAR; INTRAVENOUS at 05:04

## 2024-04-07 RX ADMIN — IPRATROPIUM BROMIDE AND ALBUTEROL SULFATE 3 ML: 2.5; .5 SOLUTION RESPIRATORY (INHALATION) at 03:04

## 2024-04-07 RX ADMIN — ENOXAPARIN SODIUM 40 MG: 40 INJECTION SUBCUTANEOUS at 05:04

## 2024-04-07 RX ADMIN — LEVETIRACETAM 750 MG: 100 SOLUTION ORAL at 09:04

## 2024-04-07 RX ADMIN — LEVETIRACETAM 750 MG: 100 SOLUTION ORAL at 03:04

## 2024-04-07 RX ADMIN — ALBUTEROL SULFATE 5 MG: 2.5 SOLUTION RESPIRATORY (INHALATION) at 01:04

## 2024-04-07 RX ADMIN — IPRATROPIUM BROMIDE AND ALBUTEROL SULFATE 3 ML: 2.5; .5 SOLUTION RESPIRATORY (INHALATION) at 08:04

## 2024-04-07 NOTE — ED NOTES
PATIENT ROOMED DUE TO RESPIRATORY DISTRESS WITH SPO2 OF 87% RA AND IMMEDIATELY PLACED ON O2 VIA NON RE-BREATHER AT 15 L

## 2024-04-07 NOTE — ED PROVIDER NOTES
Encounter Date: 4/6/2024       History     Chief Complaint   Patient presents with    Shortness of Breath     Reports SOB all day with HA. Hx of asthma. Presents with gasping respirations with retractions. SPO2 initially 87% RA.     Chief complaint:  Shortness of breath     Patient is a 24-year-old male who states that he has been short of breath all day.  He states he has been out of his rescue inhaler.  He also reports that he has never been intubated for asthma exacerbation.    The history is provided by the patient. No  was used.     Review of patient's allergies indicates:  No Known Allergies  Past Medical History:   Diagnosis Date    Asthma     Seizures      History reviewed. No pertinent surgical history.  History reviewed. No pertinent family history.  Social History     Tobacco Use    Smoking status: Never    Smokeless tobacco: Never   Substance Use Topics    Alcohol use: Never    Drug use: Never     Review of Systems   Unable to perform ROS: Acuity of condition   Respiratory:  Positive for shortness of breath.        Physical Exam     Initial Vitals   BP Pulse Resp Temp SpO2   04/06/24 2052 04/06/24 2052 04/06/24 2048 04/06/24 2058 04/06/24 2048   (!) 182/144 (!) 127 14 98.6 °F (37 °C) (!) 87 %      MAP       --                Physical Exam    Nursing note and vitals reviewed.  Constitutional: He appears well-developed and well-nourished. He is not diaphoretic. He appears distressed.   HENT:   Head: Normocephalic and atraumatic.   Right Ear: External ear normal.   Left Ear: External ear normal.   Nose: Nose normal.   Eyes: Pupils are equal, round, and reactive to light. Right eye exhibits no discharge. Left eye exhibits no discharge. No scleral icterus.   Neck:   Normal range of motion.  Pulmonary/Chest: Accessory muscle usage present. Bradypnea noted. He is in respiratory distress.   Breath sounds are reduced throughout.   Abdominal: He exhibits no distension.   Musculoskeletal:          General: Normal range of motion.      Cervical back: Normal range of motion.     Neurological: He is alert and oriented to person, place, and time.   Skin: Skin is dry. Capillary refill takes less than 2 seconds.         ED Course   Critical Care    Date/Time: 4/6/2024 9:02 PM    Performed by: Timothy Edge DNP  Authorized by: Rozina Maria MD  Direct patient critical care time: 5 minutes  Additional history critical care time: 5 minutes  Ordering / reviewing critical care time: 5 minutes  Documentation critical care time: 5 minutes  Consulting other physicians critical care time: 5 minutes  Consult with family critical care time: 5 minutes  Total critical care time (exclusive of procedural time) : 30 minutes  Critical care time was exclusive of separately billable procedures and treating other patients and teaching time.  Critical care was necessary to treat or prevent imminent or life-threatening deterioration of the following conditions: respiratory failure.  Critical care was time spent personally by me on the following activities: pulse oximetry, re-evaluation of patient's condition, review of old charts, ordering and review of laboratory studies, ordering and review of radiographic studies, ordering and performing treatments and interventions, obtaining history from patient or surrogate, examination of patient, evaluation of patient's response to treatment and interpretation of cardiac output measurements.        Labs Reviewed   POCT URINALYSIS W/O SCOPE - Abnormal; Notable for the following components:       Result Value    Ketones, UA 1+ (*)     Spec Grav UA >=1.030 (*)     Blood, UA Trace-intact (*)     All other components within normal limits   ISTAT PROCEDURE - Abnormal; Notable for the following components:    POC PH 7.250 (*)     POC PCO2 70.4 (*)     POC HCO3 30.9 (*)     POC TCO2 33 (*)     All other components within normal limits   POCT CMP - Abnormal; Notable for the following  components:    POC Sodium 146 (*)     All other components within normal limits   ISTAT PROCEDURE - Abnormal; Notable for the following components:    POC PH 7.299 (*)     POC PCO2 55.9 (*)     POC PO2 172 (*)     POC TCO2 29 (*)     All other components within normal limits   ISTAT PROCEDURE - Abnormal; Notable for the following components:    POC PH 7.268 (*)     POC PCO2 60.3 (*)     All other components within normal limits   TROPONIN ISTAT   POCT CBC   SARS-COV-2 RDRP GENE    Narrative:     This test utilizes isothermal nucleic acid amplification technology to detect the SARS-CoV-2 RdRp nucleic acid segment. The analytical sensitivity (limit of detection) is 500 copies/swab.     A POSITIVE result is indicative of the presence of SARS-CoV-2 RNA; clinical correlation with patient history and other diagnostic information is necessary to determine patient infection status.    A NEGATIVE result means that SARS-CoV-2 nucleic acids are not present above the limit of detection. A NEGATIVE result should be treated as presumptive. It does not rule out the possibility of COVID-19 and should not be the sole basis for treatment decisions. If COVID-19 is strongly suspected based on clinical and exposure history, re-testing using an alternate molecular assay should be considered.     Commercial kits are provided by [employer].   _________________________________________________________________   The authorized Fact Sheet for Healthcare Providers and the authorized Fact Sheet for Patients of the ID NOW COVID-19 are available on the FDA website:    https://www.fda.gov/media/048718/download      https://www.fda.gov/media/053588/download      POCT URINALYSIS(INSTRUMENT)   POCT INFLUENZA A/B MOLECULAR   POCT CMP   POCT TROPONIN   POCT D DIMER   POCT B-TYPE NATRIURETIC PEPTIDE (BNP)   POCT RAPID INFLUENZA A/B   POCT B-TYPE NATRIURETIC PEPTIDE (BNP)   POCT D DIMER        ECG Results              EKG 12-lead (Preliminary  result)  Result time 04/06/24 21:21:20      Wet Read by Timothy Edge DNP (04/06/24 21:21:20, [facility], Emergency Medicine)    Independent EKG interpretation of the study dated April 6, 2024 at 20:58 this study is sign by Dr. Rozina Maria.    Sinus tachycardia ventricular rate of 135 QTC interval 453 milliseconds.  There is no S1 Q 3 T3.  No ectopy.  Right axis deviation.                                  Imaging Results              X-Ray Chest AP Portable (Final result)  Result time 04/06/24 21:04:53      Final result by Jamaal Torres MD (04/06/24 21:04:53)                   Impression:      No acute findings in the chest.      Electronically signed by: Jamaal Torres MD  Date:    04/06/2024  Time:    21:04               Narrative:    EXAMINATION:  XR CHEST AP PORTABLE    CLINICAL HISTORY:  Shortness of breath    TECHNIQUE:  Single frontal view of the chest was performed.    COMPARISON:  11/16/2021.    FINDINGS:  No consolidation, pleural effusion or pneumothorax.    Cardiomediastinal silhouette is unremarkable.                                       Medications   albuterol sulfate nebulizer solution 10 mg (10 mg Nebulization Given 4/6/24 2106)   methylPREDNISolone sodium succinate injection 125 mg (125 mg Intramuscular Given 4/6/24 2057)   magnesium sulfate 2g in water 50mL IVPB (premix) (0 g Intravenous Stopped 4/6/24 2112)   sodium chloride 0.9% bolus 1,000 mL 1,000 mL (0 mLs Intravenous Stopped 4/6/24 2300)     Medical Decision Making  Patient is a 24-year-old male who states that he has been short of breath all day.  He states he has been out of his rescue inhaler.  He also reports that he has never been intubated for asthma exacerbation.    On initial assessment the patient is sitting in a wheelchair he is gasping at breath with accessory muscle usage.  Breath sounds are reduced throughout.  He appears somnolent.  87% on room air with a heart rate in the 120s.    Amount and/or  Complexity of Data Reviewed  Labs: ordered. Decision-making details documented in ED Course.  Radiology: ordered. Decision-making details documented in ED Course.    Risk  Prescription drug management.  Decision regarding hospitalization.               ED Course as of 04/07/24 0051   Sat Apr 06, 2024 2101 TARAS Maria MD has been in the room and independently examined the patient. [VC]   2102 BP(!): 182/144 [VC]   2102 Pulse(!): 127 [VC]   2102 Resp: 14 [VC]   2102 SpO2: 100 % [VC]   2115 X-Ray Chest AP Portable       No acute findings in the chest.   [VC]   2115 BP(!): 213/107 [VC]   2115 Pulse(!): 136 [VC]   2115 Resp: 18 [VC]   2115 SpO2: 99 % [VC]   2125 Acidosis is present, likely respiratory secondary to the patient's condition. [VC]   2144 POC D-DI: <100 [VC]   2144 SARS-CoV-2 RNA, Amplification, Qual: Negative [VC]   2144 POC B-Type Natriuretic Peptide: 6.4 [VC]   2144 Influenza B Ag: negative [VC]   2144 Inflenza A Ag: negative [VC]   2144 POCT CMP(!)  Essentially normal cmp. [VC]   2145 POC Cardiac Troponin I: 0.00 [VC]   2145 Sample: unknown [VC]   2145 POCT CBC  Very elevated wbc, normal h/h and platelet count. [VC]   2145 BP(!): 135/91 [VC]   2145 Pulse(!): 127 [VC]   2145 Resp(!): 22 [VC]   2145 SpO2: 100 % [VC]   2151 Temp: 97.5 °F (36.4 °C) [VC]   2151 Temp Source: Rectal [VC]   2157 BP(!): 143/93 [VC]   2158 Pulse(!): 122 [VC]   2158 Resp(!): 24 [VC]   2158 SpO2: 100 % [VC]   2158 Dr. Maria assumes full care of the patient at this time. [VC]      ED Course User Index  [VC] Timothy Edge DNP             Labs Reviewed    Admission on 04/06/2024   Component Date Value Ref Range Status    POC Rapid COVID 04/06/2024 Negative  Negative Final     Acceptable 04/06/2024 Yes   Final    POC PH 04/06/2024 7.250 (LL)  7.35 - 7.45 Final    POC PCO2 04/06/2024 70.4 (H)  35 - 45 mmHg Final    POC PO2 04/06/2024 54  40 - 60 mmHg Final    POC HCO3 04/06/2024 30.9 (H)  24 - 28 mmol/L Final    POC  BE 04/06/2024 1  -2 to 2 mmol/L Final    POC SATURATED O2 04/06/2024 81  95 - 100 % Final    POC Lactate 04/06/2024 1.15  0.5 - 2.2 mmol/L Final    POC TCO2 04/06/2024 33 (H)  24 - 29 mmol/L Final    Rate 04/06/2024 12   Final    Sample 04/06/2024 VENOUS   Final    Site 04/06/2024 Other   Final    Allens Test 04/06/2024 N/A   Final    DelSys 04/06/2024 CPAP/BiPAP   Final    Mode 04/06/2024 BiPAP   Final    FiO2 04/06/2024 60   Final    Spont Rate 04/06/2024 18   Final    Sp02 04/06/2024 99   Final    IP 04/06/2024 12   Final    EP 04/06/2024 6   Final    Albumin, POC 04/06/2024 4.7  3.3 - 5.5 g/dL Final    Alkaline Phosphatase, POC 04/06/2024 106  42 - 141 U/L Final    ALT (SGPT), POC 04/06/2024 15  10 - 47 U/L Final    AST (SGOT), POC 04/06/2024 29  11 - 38 U/L Final    POC BUN 04/06/2024 10  7 - 22 mg/dL Final    Calcium, POC 04/06/2024 10.1  8.0 - 10.3 mg/dL Final    POC Chloride 04/06/2024 104  98 - 108 mmol/L Final    POC Creatinine 04/06/2024 1.0  0.6 - 1.2 mg/dL Final    POC Glucose 04/06/2024 102  73 - 118 mg/dL Final    POC Potassium 04/06/2024 4.2  3.6 - 5.1 mmol/L Final    POC Sodium 04/06/2024 146 (H)  128 - 145 mmol/L Final    Bilirubin, POC 04/06/2024 0.9  0.2 - 1.6 mg/dL Final    POC TCO2 04/06/2024 30  18 - 33 mmol/L Final    Protein, POC 04/06/2024 8.1  6.4 - 8.1 g/dL Final    POC Cardiac Troponin I 04/06/2024 0.00  0.00 - 0.08 ng/mL Final    Sample 04/06/2024 unknown   Final    Comment: A single negative troponin is insufficient to rule out myocardial infarction.  The use of a serial sampling protocol is recommended practice. Correlate results with reference intervals established for methodology used. Point of care and core laboratory   troponin results are not interchangeable.      Influenza B Ag 04/06/2024 negative  Positive/Negative Final    Inflenza A Ag 04/06/2024 negative  Positive/Negative Final    POC B-Type Natriuretic Peptide 04/06/2024 6.4  0.0 - 100.0 pg/mL Final    POC D-DI 04/06/2024  <100  0.0 - 450.0 ng/mL Final    POC PH 04/06/2024 7.299 (L)  7.35 - 7.45 Final    POC PCO2 04/06/2024 55.9 (H)  35 - 45 mmHg Final    POC PO2 04/06/2024 172 (H)  80 - 100 mmHg Final    POC HCO3 04/06/2024 27.5  24 - 28 mmol/L Final    POC BE 04/06/2024 0  -2 to 2 mmol/L Final    POC SATURATED O2 04/06/2024 99  95 - 100 % Final    POC Lactate 04/06/2024 0.64  0.36 - 1.25 mmol/L Final    POC TCO2 04/06/2024 29 (H)  23 - 27 mmol/L Final    Rate 04/06/2024 12   Final    Sample 04/06/2024 ARTERIAL   Final    Site 04/06/2024 RR   Final    Allens Test 04/06/2024 Pass   Final    DelSys 04/06/2024 CPAP/BiPAP   Final    Mode 04/06/2024 BiPAP   Final    FiO2 04/06/2024 35   Final    Spont Rate 04/06/2024 20   Final    Min Vol 04/06/2024 13   Final    Sp02 04/06/2024 100   Final    IP 04/06/2024 12   Final    EP 04/06/2024 6   Final    Glucose, UA 04/06/2024 Negative   Final    Bilirubin, UA 04/06/2024 Negative   Final    Ketones, UA 04/06/2024 1+ (A)   Final    Spec Grav UA 04/06/2024 >=1.030 (>)   Final    Blood, UA 04/06/2024 Trace-intact (A)   Final    PH, UA 04/06/2024 5.5   Final    Protein, UA 04/06/2024 Negative   Final    Urobilinogen, UA 04/06/2024 0.2  E.U./dL Final    Nitrite, UA 04/06/2024 Negative   Final    Leukocytes, UA 04/06/2024 Negative   Final    Color, UA 04/06/2024 Yellow   Final    Clarity, UA 04/06/2024 Clear   Final    POC PH 04/07/2024 7.268 (LL)  7.35 - 7.45 Final    POC PCO2 04/07/2024 60.3 (H)  35 - 45 mmHg Final    POC PO2 04/07/2024 56  40 - 60 mmHg Final    POC HCO3 04/07/2024 27.5  24 - 28 mmol/L Final    POC BE 04/07/2024 -1  -2 to 2 mmol/L Final    POC SATURATED O2 04/07/2024 84  95 - 100 % Final    POC Lactate 04/07/2024 0.97  0.5 - 2.2 mmol/L Final    POC TCO2 04/07/2024 29  24 - 29 mmol/L Final    Rate 04/07/2024 12   Final    Sample 04/07/2024 VENOUS   Final    Site 04/07/2024 Other   Final    Allens Test 04/07/2024 N/A   Final    DelSys 04/07/2024 CPAP/BiPAP   Final    Mode 04/07/2024  BiPAP   Final    FiO2 04/07/2024 35   Final    Spont Rate 04/07/2024 18   Final    Sp02 04/07/2024 100   Final    IP 04/07/2024 12   Final    EP 04/07/2024 6   Final        Imaging Reviewed    Imaging Results              X-Ray Chest AP Portable (Final result)  Result time 04/06/24 21:04:53      Final result by Jamaal Torres MD (04/06/24 21:04:53)                   Impression:      No acute findings in the chest.      Electronically signed by: Jamaal Torres MD  Date:    04/06/2024  Time:    21:04               Narrative:    EXAMINATION:  XR CHEST AP PORTABLE    CLINICAL HISTORY:  Shortness of breath    TECHNIQUE:  Single frontal view of the chest was performed.    COMPARISON:  11/16/2021.    FINDINGS:  No consolidation, pleural effusion or pneumothorax.    Cardiomediastinal silhouette is unremarkable.                                      Medications given in ED    Medications   albuterol sulfate nebulizer solution 10 mg (10 mg Nebulization Given 4/6/24 2106)   methylPREDNISolone sodium succinate injection 125 mg (125 mg Intramuscular Given 4/6/24 2057)   magnesium sulfate 2g in water 50mL IVPB (premix) (0 g Intravenous Stopped 4/6/24 2112)   sodium chloride 0.9% bolus 1,000 mL 1,000 mL (0 mLs Intravenous Stopped 4/6/24 2300)       Note was created using voice recognition software. Note may have occasional typographical errors that may not have been identified and edited despite good flor initial review prior to signing.                Clinical Impression:  Final diagnoses:  [R06.02] SOB (shortness of breath)  [J96.01, J96.02] Acute respiratory failure with hypoxia and hypercapnia (Primary)  [J45.901] Asthma with acute exacerbation, unspecified asthma severity, unspecified whether persistent  [J45.901] Asthma exacerbation          ED Disposition Condition    Admit                 Timothy Edge, FANNY  04/07/24 0939

## 2024-04-07 NOTE — NURSING
"[facility] - Intensive Care  ICU Shift Summary  Date: 4/7/2024      Prehospitalization: Home  Admit Date / LOS : 4/6/2024/ 0 days    Diagnosis: Asthma exacerbation    Consults:        Active: N/A       Needed: N/A     Code Status: Full Code   Advanced Directive: <no information>    LDA:  Lines/Drains/Airways       Peripheral Intravenous Line  Duration                  Peripheral IV - Single Lumen 04/06/24 2050 18 G Left Antecubital <1 day         Peripheral IV - Single Lumen 04/07/24 0125 18 G Posterior;Right Forearm <1 day                  Central Lines/Site/Justification:Patient Does Not Have Central Line  Urinary Cath/Order/Justification:Patient Does Not Have Urinary Catheter    Vasopressors/Infusions:        GOALS: Volume/ Hemodynamic: N/A                     RASS: N/A    Pain Management: none       Pain Controlled: not applicable     Rhythm: NSR    Respiratory Device: Bipap    Oxygen Concentration (%):  [30-60] 30                 Most Recent SBT/ SAT: N/A      VTE Prophylaxis: Mechanical  Mobility: Bedrest  Stress Ulcer Prophylaxis: Yes    Isolation: No active isolations    Dietary:   Current Diet Order   Procedures    Diet NPO Except for: Sips with Medication     Order Specific Question:   Except for     Answer:   Sips with Medication      Tolerance: not applicable      I & O (24h):    Intake/Output Summary (Last 24 hours) at 4/7/2024 0504  Last data filed at 4/7/2024 0400  Gross per 24 hour   Intake 1049 ml   Output 700 ml   Net 349 ml        Restraints: No    Significant Dates:  Post Op Date: N/A  Rescue Date: N/A  Imaging/ Diagnostics: N/A    Noteworthy Labs:  none    COVID Test: (--)  CBC/Anemia Labs: Coags:    No results for input(s): "WBC", "HGB", "HCT", "PLT", "MCV", "RDW", "IRON", "FERRITIN", "RETIC", "FOLATE", "[id number]", "OCCULTBLOOD" in the last 168 hours.    Invalid input(s): "IRONSATURATED" No results for input(s): "PT", "INR", "APTT" in the last 168 hours.     Chemistries:   No results for " "input(s): "NA", "K", "CL", "CO2", "BUN", "CREATININE", "CALCIUM", "PROT", "BILITOT", "ALKPHOS", "ALT", "AST", "GLUCOSE", "MG", "PHOS" in the last 168 hours.    Invalid input(s): "LABALBU"     Cardiac Enzymes: Ejection Fractions:    No results for input(s): "CPK", "CPKMB", "MB", "TROPONINI" in the last 72 hours. No results found for: "EF"     POCT Glucose: HbA1c:    No results for input(s): "POCTGLUCOSE" in the last 168 hours. No results found for: "HGBA1C"        ICU LOS 2h  Level of Care: Critical Care    Chart Check: 12 HR Done  Shift Summary/Plan for the shift: none  "

## 2024-04-07 NOTE — SUBJECTIVE & OBJECTIVE
Past Medical History:   Diagnosis Date    Asthma     Seizures        History reviewed. No pertinent surgical history.    Review of patient's allergies indicates:  No Known Allergies    Family History    None       Tobacco Use    Smoking status: Never    Smokeless tobacco: Never   Substance and Sexual Activity    Alcohol use: Never    Drug use: Never    Sexual activity: Not Currently         Review of Systems   Constitutional: Negative.    HENT: Negative.     Eyes: Negative.    Respiratory:  Positive for cough, shortness of breath and wheezing.    Cardiovascular: Negative.    Gastrointestinal: Negative.    Endocrine: Negative.    Genitourinary: Negative.    Musculoskeletal: Negative.    Skin: Negative.    Allergic/Immunologic: Negative.    Neurological: Negative.    Psychiatric/Behavioral: Negative.       Objective:     Vital Signs (Most Recent):  Temp: 97.9 °F (36.6 °C) (04/07/24 0701)  Pulse: 83 (04/07/24 1000)  Resp: 17 (04/07/24 1000)  BP: 134/65 (04/07/24 1000)  SpO2: 100 % (04/07/24 1000) Vital Signs (24h Range):  Temp:  [96.9 °F (36.1 °C)-98.6 °F (37 °C)] 97.9 °F (36.6 °C)  Pulse:  [] 83  Resp:  [14-30] 17  SpO2:  [30 %-100 %] 100 %  BP: (121-213)/() 134/65     Weight: 85.3 kg (188 lb 0.8 oz)  Body mass index is 24.14 kg/m².      Intake/Output Summary (Last 24 hours) at 4/7/2024 1028  Last data filed at 4/7/2024 0400  Gross per 24 hour   Intake 1049 ml   Output 700 ml   Net 349 ml        Physical Exam  Vitals and nursing note reviewed.   Constitutional:       General: He is not in acute distress.     Appearance: Normal appearance. He is not ill-appearing.   HENT:      Head: Normocephalic and atraumatic.      Nose: Nose normal.      Mouth/Throat:      Mouth: Mucous membranes are moist.   Eyes:      Extraocular Movements: Extraocular movements intact.   Cardiovascular:      Rate and Rhythm: Normal rate.      Pulses: Normal pulses.      Heart sounds: No murmur heard.  Pulmonary:      Effort: Pulmonary  "effort is normal. No respiratory distress.      Breath sounds: No wheezing.   Abdominal:      General: Abdomen is flat.      Palpations: Abdomen is soft.      Tenderness: There is no abdominal tenderness.   Musculoskeletal:      Right lower leg: No edema.      Left lower leg: No edema.   Skin:     General: Skin is warm.      Capillary Refill: Capillary refill takes less than 2 seconds.   Neurological:      General: No focal deficit present.      Mental Status: He is alert.   Psychiatric:         Mood and Affect: Mood normal.          Vents:  Oxygen Concentration (%): 30 (04/07/24 0701)    Lines/Drains/Airways       Peripheral Intravenous Line  Duration                  Peripheral IV - Single Lumen 04/06/24 2050 18 G Left Antecubital <1 day         Peripheral IV - Single Lumen 04/07/24 0125 18 G Posterior;Right Forearm <1 day                    Significant Labs:    CBC/Anemia Profile:  Recent Labs   Lab 04/07/24  0528   WBC 10.76   HGB 14.8   HCT 45.9      MCV 89   RDW 11.4*        Chemistries:  Recent Labs   Lab 04/07/24  0528      K 4.6      CO2 23   BUN 9   CREATININE 1.0   CALCIUM 9.8   ALBUMIN 4.4   PROT 7.5   BILITOT 0.9   ALKPHOS 103   ALT 15   AST 23   MG 2.1   PHOS 2.8       ABGs:   Recent Labs   Lab 04/07/24  0919   PH 7.286*   PCO2 69.0*   HCO3 32.9*   POCSATURATED 39   BE 4*     Blood Culture: No results for input(s): "LABBLOO" in the last 48 hours.  Cardiac Markers: No results for input(s): "CKMB", "TROPONINT", "MYOGLOBIN" in the last 48 hours.  Lactic Acid: No results for input(s): "LACTATE" in the last 48 hours.  Respiratory Culture: No results for input(s): "GSRESP", "RESPIRATORYC" in the last 48 hours.  Troponin: No results for input(s): "TROPONINI" in the last 48 hours.    BNP  No results for input(s): "BNP", "BNPTRIAGEBLO" in the last 168 hours.      Significant Imaging:   I have reviewed all pertinent imaging results/findings within the past 24 hours.  CXR: I have reviewed all " pertinent results/findings within the past 24 hours and my personal findings are:  no consolidation or effusion

## 2024-04-07 NOTE — ASSESSMENT & PLAN NOTE
Continue Solu-Medrol, scheduled Duo-Nebs  Wean BiPAP as tolerated  Optimize inhaler on discharge

## 2024-04-07 NOTE — ASSESSMENT & PLAN NOTE
Continue Solu-Medrol, scheduled Duo-Nebs  Wean BiPAP as tolerated  Pulmonology on board given worsening hypercapnia  COVID negative in ED chest x-ray negative  Optimize inhaler on discharge

## 2024-04-07 NOTE — HPI
This is a 24-year-old male with a past medical history of asthma, seizure disorder who presents with shortness of breath.     Patient presents with sudden-onset shortness of breaths that started on the day of presentation.  He reports onset of dyspnea while doing physical work, and possibly being exposed to fumes which typically triggers asthma.  He was compliant with his inhalers at home.  Additional symptoms include chest tightness associated with his dyspnea.    In the ED, the patient was tachycardic (120s), tachypneic, hypertensive and was placed on BiPAP. Labs showed leukocytosis (19.4). VBG showed a PH of 7.250 > 7.268, PCO2 of 70.4 > 60.3. Chest X-ray showed no acute findings. Patient was given 1L of NS, Solumedrol 125 mg IV, magnesium sulfate 2 g IV, Duo-Nebs x2. He was admitted for further management.

## 2024-04-07 NOTE — CONSULTS
[facility] - Intensive Care  Critical Care Medicine  Consult Note    Patient Name: Raymond Duarte  MRN: [id number]  Admission Date: 4/6/2024  Hospital Length of Stay: 0 days  Code Status: Full Code  Attending Physician: Kenna Kraft,*   Primary Care Provider: [facility] -   Principal Problem: Asthma exacerbation    Inpatient consult to Pulmonology  Consult performed by: Alireza Mcneil MD  Consult ordered by: Kenna Kraft MD        Subjective:     HPI:  Patient is 24 y.o. male  has a past medical history of Asthma and Seizures. presented to [facility] on 4/6/24 with sob x 1 day.  He reports onset of dyspnea while doing physical work, and possibly being exposed to fumes which typically triggers asthma.     In the ED, the patient was tachycardic (120s), tachypneic, hypertensive and was placed on BiPAP. Labs showed leukocytosis (19.4). VBG showed a PH of 7.250 > 7.268, PCO2 of 70.4 > 60.3. Chest X-ray showed no acute findings. Patient was given 1L of NS, Solumedrol 125 mg IV, magnesium sulfate 2 g IV, Duo-Nebs x2. He was admitted for further management.    Initial ABG was 7.29/56/172/28.  patient was placed on bpap 16/8.  Repeat abg showed worsening of hypercapnea with 7.28/69/26/33.  Pulmonary was consulted for further inputs.      During my initial evaluation, patient was resting comfortably on bipap 15/8 with 30% FiO2.  VSS.  Patient was alert and interactive.  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  Feeling much better since hospitalization.  Patient was diagnosed with asthma as child and has been taking albuterol and steroid.  He was not clear about name or dose of drugs.        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 No pertinent surgical history.    Review of patient's allergies indicates:  No Known Allergies    Family History    None       Tobacco Use    Smoking status: Never    Smokeless tobacco: Never   Substance and Sexual Activity    Alcohol use: Never    Drug use: Never    Sexual activity: Not Currently         Review of Systems   Constitutional: Negative.    HENT: Negative.     Eyes: Negative.    Respiratory:  Positive for cough, shortness of breath and wheezing.    Cardiovascular: Negative.    Gastrointestinal: Negative.    Endocrine: Negative.    Genitourinary: Negative.    Musculoskeletal: Negative.    Skin: Negative.    Allergic/Immunologic: Negative.    Neurological: Negative.    Psychiatric/Behavioral: Negative.       Objective:     Vital Signs (Most Recent):  Temp: 97.9 °F (36.6 °C) (04/07/24 0701)  Pulse: 83 (04/07/24 1000)  Resp: 17 (04/07/24 1000)  BP: 134/65 (04/07/24 1000)  SpO2: 100 % (04/07/24 1000) Vital Signs (24h Range):  Temp:  [96.9 °F (36.1 °C)-98.6 °F (37 °C)] 97.9 °F (36.6 °C)  Pulse:  [] 83  Resp:  [14-30] 17  SpO2:  [30 %-100 %] 100 %  BP: (121-213)/() 134/65     Weight: 85.3 kg (188 lb 0.8 oz)  Body mass index is 24.14 kg/m².      Intake/Output Summary (Last 24 hours) at 4/7/2024 1028  Last data filed at 4/7/2024 0400  Gross per 24 hour   Intake 1049 ml   Output 700 ml   Net 349 ml        Physical Exam  Vitals and nursing note reviewed.   Constitutional:       General: He is not in acute distress.     Appearance: Normal appearance. He is not ill-appearing.   HENT:      Head: Normocephalic and atraumatic.      Nose: Nose normal.      Mouth/Throat:      Mouth: Mucous membranes are moist.   Eyes:      Extraocular Movements: Extraocular  "movements intact.   Cardiovascular:      Rate and Rhythm: Normal rate.      Pulses: Normal pulses.      Heart sounds: No murmur heard.  Pulmonary:      Effort: Pulmonary effort is normal. No respiratory distress.      Breath sounds: No wheezing.   Abdominal:      General: Abdomen is flat.      Palpations: Abdomen is soft.      Tenderness: There is no abdominal tenderness.   Musculoskeletal:      Right lower leg: No edema.      Left lower leg: No edema.   Skin:     General: Skin is warm.      Capillary Refill: Capillary refill takes less than 2 seconds.   Neurological:      General: No focal deficit present.      Mental Status: He is alert.   Psychiatric:         Mood and Affect: Mood normal.          Vents:  Oxygen Concentration (%): 30 (04/07/24 0701)    Lines/Drains/Airways       Peripheral Intravenous Line  Duration                  Peripheral IV - Single Lumen 04/06/24 2050 18 G Left Antecubital <1 day         Peripheral IV - Single Lumen 04/07/24 0125 18 G Posterior;Right Forearm <1 day                    Significant Labs:    CBC/Anemia Profile:  Recent Labs   Lab 04/07/24  0528   WBC 10.76   HGB 14.8   HCT 45.9      MCV 89   RDW 11.4*        Chemistries:  Recent Labs   Lab 04/07/24  0528      K 4.6      CO2 23   BUN 9   CREATININE 1.0   CALCIUM 9.8   ALBUMIN 4.4   PROT 7.5   BILITOT 0.9   ALKPHOS 103   ALT 15   AST 23   MG 2.1   PHOS 2.8       ABGs:   Recent Labs   Lab 04/07/24  0919   PH 7.286*   PCO2 69.0*   HCO3 32.9*   POCSATURATED 39   BE 4*     Blood Culture: No results for input(s): "LABBLOO" in the last 48 hours.  Cardiac Markers: No results for input(s): "CKMB", "TROPONINT", "MYOGLOBIN" in the last 48 hours.  Lactic Acid: No results for input(s): "LACTATE" in the last 48 hours.  Respiratory Culture: No results for input(s): "GSRESP", "RESPIRATORYC" in the last 48 hours.  Troponin: No results for input(s): "TROPONINI" in the last 48 hours.    BNP  No results for input(s): "BNP", " ""BNPTRIAGEBLO" in the last 168 hours.      Significant Imaging:   I have reviewed all pertinent imaging results/findings within the past 24 hours.  CXR: I have reviewed all pertinent results/findings within the past 24 hours and my personal findings are:  no consolidation or effusion    ABG  Recent Labs   Lab 04/07/24  0919   PH 7.286*   PO2 26*   PCO2 69.0*   HCO3 32.9*   BE 4*     Assessment/Plan:     Pulmonary  * Asthma exacerbation  Steroid + nebs  Will need laba/ics upon discharge.        Acute respiratory failure with hypoxia and hypercapnia  Patient with Hypercapnic Respiratory failure which is Acute on chronic.  he is not on home oxygen. Supplemental oxygen was provided and noted- Oxygen Concentration (%):  [30-60] 30    .   Signs/symptoms of respiratory failure include- tachypnea, respiratory distress, and wheezing. Contributing diagnoses includes -  asthma exacerbation  Labs and images were reviewed. Patient Has recent ABG, which has been reviewed. Will treat underlying causes and adjust management of respiratory failure as follows-   Abg c/w acute on chronic hypercapnic respiratory failure.  Unclear of etiology for progressive escalation of PCO2 during hospitalization.  Patient is pull good tidal volume ~500 cc or more while on bipap.  Will increase ipap from 15 to 18 for more PS.  Will repeat abg.    Continue with steroid + nebs.   Subjectively feeling better.  Clinically stable.          Critical Care Time: 40 minutes  Critical secondary to Patient has a condition that poses threat to life and bodily function: hypercapnic respiratory failure and asthma exacerbation.     Critical care was time spent personally by me on the following activities: development of treatment plan with patient or surrogate and bedside caregivers, discussions with consultants, evaluation of patient's response to treatment, examination of patient, ordering and performing treatments and interventions, ordering and review of " laboratory studies, ordering and review of radiographic studies, pulse oximetry, re-evaluation of patient's condition. This critical care time did not overlap with that of any other provider or involve time for any procedures.    Thank you for your consult. I will follow-up with patient. Please contact us if you have any additional questions.     Alireza Mcneil MD  Critical Care Medicine  [facility] [place] - Intensive Care

## 2024-04-07 NOTE — NURSING
[facility]  Nurses Note -- 4 Eyes      4/7/2024       Skin assessed on: Admit      [x] No Pressure Injuries Present    []Prevention Measures Documented    [] Yes LDA  for Pressure Injury Previously documented     [] Yes New Pressure Injury Discovered   [] LDA for New Pressure Injury Added      Attending RN:  Edyta Pandya RN     Second RN:  BONNIE Munoz

## 2024-04-07 NOTE — HPI
Patient is 24 y.o. male  has a past medical history of Asthma and Seizures. presented to [facility] on 4/6/24 with sob x 1 day.  He reports onset of dyspnea while doing physical work, and possibly being exposed to fumes which typically triggers asthma.     In the ED, the patient was tachycardic (120s), tachypneic, hypertensive and was placed on BiPAP. Labs showed leukocytosis (19.4). VBG showed a PH of 7.250 > 7.268, PCO2 of 70.4 > 60.3. Chest X-ray showed no acute findings. Patient was given 1L of NS, Solumedrol 125 mg IV, magnesium sulfate 2 g IV, Duo-Nebs x2. He was admitted for further management.    Initial ABG was 7.29/56/172/28.  patient was placed on bpap 16/8.  Repeat abg showed worsening of hypercapnea with 7.28/69/26/33.  Pulmonary was consulted for further inputs.      During my initial evaluation, patient was resting comfortably on bipap 15/8 with 30% FiO2.  VSS.  Patient was alert and interactive.  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  Feeling much better since hospitalization.  Patient was diagnosed with asthma as child and has been taking albuterol and steroid.  He was not clear about name or dose of drugs.        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

## 2024-04-07 NOTE — SUBJECTIVE & OBJECTIVE
Past Medical History:   Diagnosis Date    Asthma     Seizures        History reviewed. No pertinent surgical history.    Review of patient's allergies indicates:  No Known Allergies    No current facility-administered medications on file prior to encounter.     Current Outpatient Medications on File Prior to Encounter   Medication Sig    albuterol (PROVENTIL/VENTOLIN HFA) 90 mcg/actuation inhaler Inhale 2 puffs into the lungs every 6 (six) hours as needed for Wheezing. Use with spacer  Dispense with 1 spacer    albuterol sulfate 2.5 mg/0.5 mL Nebu Take 2.5 mg by nebulization every 4 (four) hours as needed (As needed for shortness of breath). Rescue    cetirizine (ZYRTEC) 10 MG tablet Take 10 mg by mouth daily as needed for Allergies.    FLUoxetine 40 MG capsule Take 40 mg by mouth once daily.    levetiracetam XR (KEPPRA XR) 750 mg Tb24 tablet Take 1 tablet (750 mg total) by mouth Daily.    loratadine (CLARITIN) 10 mg tablet Take 1 tablet (10 mg total) by mouth once daily.     Family History    None       Tobacco Use    Smoking status: Never    Smokeless tobacco: Never   Substance and Sexual Activity    Alcohol use: Never    Drug use: Never    Sexual activity: Not Currently     Review of Systems   Constitutional: Negative.    HENT: Negative.     Eyes: Negative.    Respiratory:  Positive for shortness of breath.    Cardiovascular: Negative.    Gastrointestinal: Negative.    Endocrine: Negative.    Genitourinary: Negative.    Musculoskeletal: Negative.    Skin: Negative.    Allergic/Immunologic: Negative.    Neurological: Negative.    Psychiatric/Behavioral: Negative.       Objective:     Vital Signs (Most Recent):  Temp: 96.9 °F (36.1 °C) (04/07/24 0245)  Pulse: 95 (04/07/24 0244)  Resp: 18 (04/07/24 0210)  BP: (!) 143/77 (04/07/24 0202)  SpO2: 100 % (04/07/24 0244) Vital Signs (24h Range):  Temp:  [96.9 °F (36.1 °C)-98.6 °F (37 °C)] 96.9 °F (36.1 °C)  Pulse:  [] 95  Resp:  [14-30] 18  SpO2:  [87 %-100 %] 100  %  BP: (135-213)/() 143/77     Weight: 85.3 kg (188 lb 0.8 oz)  Body mass index is 24.14 kg/m².     Physical Exam  Vitals and nursing note reviewed.   Constitutional:       General: He is not in acute distress.     Appearance: Normal appearance. He is not ill-appearing.   HENT:      Head: Normocephalic and atraumatic.      Nose: Nose normal.      Mouth/Throat:      Mouth: Mucous membranes are moist.   Eyes:      Extraocular Movements: Extraocular movements intact.   Cardiovascular:      Rate and Rhythm: Normal rate.      Pulses: Normal pulses.      Heart sounds: No murmur heard.  Pulmonary:      Effort: Pulmonary effort is normal. No respiratory distress.      Breath sounds: Wheezing present.   Abdominal:      General: Abdomen is flat.      Palpations: Abdomen is soft.      Tenderness: There is no abdominal tenderness.   Musculoskeletal:      Right lower leg: No edema.      Left lower leg: No edema.   Skin:     General: Skin is warm.      Capillary Refill: Capillary refill takes less than 2 seconds.   Neurological:      General: No focal deficit present.      Mental Status: He is alert.   Psychiatric:         Mood and Affect: Mood normal.                Significant Labs: All pertinent labs within the past 24 hours have been reviewed.    Significant Imaging: I have reviewed all pertinent imaging results/findings within the past 24 hours.

## 2024-04-07 NOTE — ADMISSIONCARE
AdmissionCare    Guideline: Asthma - INPT, Inpatient    Based on the indications selected for the patient, the bed status of Inpatient was determined to be MET    The following indications were selected as present at the time of evaluation of the patient:      - Ventilatory assistance needed (eg, mechanical ventilation, noninvasive ventilation)    AdmissionCare documentation entered by: CELIA Alcala    [facility], 27th edition, Copyright © 2023 [facility], [facility] All Rights Reserved.  [id number]

## 2024-04-07 NOTE — PLAN OF CARE
Case Management Assessment     PCP: [facility]  Pharmacy: [facility] [place] [place]  Patient Arrived From: Home   Existing Help at Home: Carymother  Barriers to Discharge: None    Discharge Plan:    A. Home with family; follow-up   B. TBD    Independent at home with mother who assist if needed; no assist needed; uses nebulizer; no current medical services; no needs anticipated; provided [facility] resource.      04/07/24 0901   Discharge Assessment   Assessment Type Discharge Planning Assessment   Confirmed/corrected address, phone number and insurance Yes   Confirmed Demographics Correct on Facesheet   Source of Information patient;health record   Communicated TRACIE with patient/caregiver Date not available/Unable to determine   Reason For Admission SOB   People in Home parent(s)   Facility Arrived From: Home   Do you expect to return to your current living situation? Yes   Do you have help at home or someone to help you manage your care at home? Yes   Who are your caregiver(s) and their phone number(s)? Carymother: [phone]   Prior to hospitilization cognitive status: Alert/Oriented   Current cognitive status: Alert/Oriented   Walking or Climbing Stairs Difficulty no   Dressing/Bathing Difficulty no   Do you have any problems with: Errands/Grocery;Needs other help   Specify other help Mother assists and transports   Home Accessibility wheelchair accessible   Home Layout Able to live on 1st floor   Equipment Currently Used at Home nebulizer   Readmission within 30 days? No   Patient currently being followed by outpatient case management? No   Do you currently have service(s) that help you manage your care at home? No   Do you take prescription medications? Yes   Do you have prescription coverage? No   Coverage Uninsured   Do you have any problems affording any of your prescribed medications? TBD   Is the patient taking medications as prescribed? yes   Who is going to help you get home at discharge? Mother   How  do you get to doctors appointments? family or friend will provide   Are you on dialysis? No   Do you take coumadin? No   Discharge Plan A Home with family  (Follow-up)   Discharge Plan B Other  (TBD)   DME Needed Upon Discharge  other (see comments)  (TBD)   Discharge Plan discussed with: Patient   Transition of Care Barriers None     SW Role explained to patient; two patient identifiers recognized; SW contact information placed on Communication board. Discussed patient managing health care at home and discussed discharge plans A and B; determined who would be helping patient at home with recovery: Mother will help with recovery at home.

## 2024-04-07 NOTE — EICU
EICU BRIEF ADMIT NOTE:    HISTORY: Please refer to H/P and ER notes for detail. On BiPAP, still wheezing    CAMERA ASSESSMENT: Two way audiovisual assessment was done. No distress    Telemetry was reviewed. Medical records including notes, labs and imaging were reviewed.    DISCUSSED with bedside nurse.    ASSESSMENT AND PLAN:    Asthma exacerbation  Acute respiratory failure  --Continue BiPAP, follow ABG  --Continue steroids and bronchodilators    [facility]ACT[facility] REVIEW:    GLYCEMIN CONTROL:  monitor blood glucose levels as the patient is on steroids  STRESS ULCER PROPHYLAXIS: already on Pepcid  DVT PROPHYLAXIS:   Lovenox    Thank You for allowing EICU to participate in the care of the patient. Please call as needed      Alistair Nguyen MD  [facility]  [phone]

## 2024-04-07 NOTE — H&P
[facility] Medicine  History & Physical    Patient Name: Raymond Duarte  MRN: [id number]  Patient Class: IP- Inpatient  Admission Date: 4/6/2024  Attending Physician: Kenna Kraft,*   Primary Care Provider: Ame, Primary Doctor         Patient information was obtained from patient and ER records.     Subjective:     Principal Problem:Asthma exacerbation    Chief Complaint:   Chief Complaint   Patient presents with    Shortness of Breath     Reports SOB all day with HA. Hx of asthma. Presents with gasping respirations with retractions. SPO2 initially 87% RA.        HPI: This is a 24-year-old male with a past medical history of asthma, seizure disorder who presents with shortness of breath.     Patient presents with sudden-onset shortness of breaths that started on the day of presentation.  He reports onset of dyspnea while doing physical work, and possibly being exposed to fumes which typically triggers asthma.  He was compliant with his inhalers at home.  Additional symptoms include chest tightness associated with his dyspnea.    In the ED, the patient was tachycardic (120s), tachypneic, hypertensive and was placed on BiPAP. Labs showed leukocytosis (19.4). VBG showed a PH of 7.250 > 7.268, PCO2 of 70.4 > 60.3. Chest X-ray showed no acute findings. Patient was given 1L of NS, Solumedrol 125 mg IV, magnesium sulfate 2 g IV, Duo-Nebs x2. He was admitted for further management.     Past Medical History:   Diagnosis Date    Asthma     Seizures        History reviewed. No pertinent surgical history.    Review of patient's allergies indicates:  No Known Allergies    No current facility-administered medications on file prior to encounter.     Current Outpatient Medications on File Prior to Encounter   Medication Sig    albuterol (PROVENTIL/VENTOLIN HFA) 90 mcg/actuation inhaler Inhale 2 puffs into the lungs every 6 (six) hours as needed for Wheezing. Use with spacer  Dispense with 1  spacer    albuterol sulfate 2.5 mg/0.5 mL Nebu Take 2.5 mg by nebulization every 4 (four) hours as needed (As needed for shortness of breath). Rescue    cetirizine (ZYRTEC) 10 MG tablet Take 10 mg by mouth daily as needed for Allergies.    FLUoxetine 40 MG capsule Take 40 mg by mouth once daily.    levetiracetam XR (KEPPRA XR) 750 mg Tb24 tablet Take 1 tablet (750 mg total) by mouth Daily.    loratadine (CLARITIN) 10 mg tablet Take 1 tablet (10 mg total) by mouth once daily.     Family History    None       Tobacco Use    Smoking status: Never    Smokeless tobacco: Never   Substance and Sexual Activity    Alcohol use: Never    Drug use: Never    Sexual activity: Not Currently     Review of Systems   Constitutional: Negative.    HENT: Negative.     Eyes: Negative.    Respiratory:  Positive for shortness of breath.    Cardiovascular: Negative.    Gastrointestinal: Negative.    Endocrine: Negative.    Genitourinary: Negative.    Musculoskeletal: Negative.    Skin: Negative.    Allergic/Immunologic: Negative.    Neurological: Negative.    Psychiatric/Behavioral: Negative.       Objective:     Vital Signs (Most Recent):  Temp: 96.9 °F (36.1 °C) (04/07/24 0245)  Pulse: 95 (04/07/24 0244)  Resp: 18 (04/07/24 0210)  BP: (!) 143/77 (04/07/24 0202)  SpO2: 100 % (04/07/24 0244) Vital Signs (24h Range):  Temp:  [96.9 °F (36.1 °C)-98.6 °F (37 °C)] 96.9 °F (36.1 °C)  Pulse:  [] 95  Resp:  [14-30] 18  SpO2:  [87 %-100 %] 100 %  BP: (135-213)/() 143/77     Weight: 85.3 kg (188 lb 0.8 oz)  Body mass index is 24.14 kg/m².     Physical Exam  Vitals and nursing note reviewed.   Constitutional:       General: He is not in acute distress.     Appearance: Normal appearance. He is not ill-appearing.   HENT:      Head: Normocephalic and atraumatic.      Nose: Nose normal.      Mouth/Throat:      Mouth: Mucous membranes are moist.   Eyes:      Extraocular Movements: Extraocular movements intact.   Cardiovascular:      Rate and  Rhythm: Normal rate.      Pulses: Normal pulses.      Heart sounds: No murmur heard.  Pulmonary:      Effort: Pulmonary effort is normal. No respiratory distress.      Breath sounds: Wheezing present.   Abdominal:      General: Abdomen is flat.      Palpations: Abdomen is soft.      Tenderness: There is no abdominal tenderness.   Musculoskeletal:      Right lower leg: No edema.      Left lower leg: No edema.   Skin:     General: Skin is warm.      Capillary Refill: Capillary refill takes less than 2 seconds.   Neurological:      General: No focal deficit present.      Mental Status: He is alert.   Psychiatric:         Mood and Affect: Mood normal.                Significant Labs: All pertinent labs within the past 24 hours have been reviewed.    Significant Imaging: I have reviewed all pertinent imaging results/findings within the past 24 hours.  Assessment/Plan:     * Asthma exacerbation  Continue Solu-Medrol, scheduled Duo-Nebs  Wean BiPAP as tolerated  Optimize inhaler on discharge       Seizure disorder  Continue Keppra      Acute respiratory failure with hypoxia and hypercapnia  Patient with Hypercapnic and Hypoxic Respiratory failure which is Acute.  he is not on home oxygen. Supplemental oxygen was provided and noted- Oxygen Concentration (%):  [30-60] 30    .   Signs/symptoms of respiratory failure include- increased work of breathing and respiratory distress. Contributing diagnoses includes -  asthma  Labs and images were reviewed. Patient Has recent ABG, which has been reviewed. Will treat underlying causes and adjust management of respiratory failure as follows- Wean BiPAP as tolerated, continue treatment for asthma.       VTE Risk Mitigation (From admission, onward)           Ordered     enoxaparin injection 40 mg  Daily         04/07/24 0246     IP VTE HIGH RISK PATIENT  Once         04/07/24 0246     Place sequential compression device  Until discontinued         04/07/24 0246                  Critical  care time spent on the evaluation and treatment of severe organ dysfunction, review of pertinent labs and imaging studies, discussions with consulting providers and discussions with patient/family: 45 minutes.             AdmissionCare    Guideline: Asthma - INPT, Inpatient    Based on the indications selected for the patient, the bed status of Inpatient was determined to be MET    The following indications were selected as present at the time of evaluation of the patient:      - Ventilatory assistance needed (eg, mechanical ventilation, noninvasive ventilation)    AdmissionCare documentation entered by: CELIA Alcala    [facility], 27th edition, Copyright © 2023 [facility] All Rights Reserved.  [id number]    Jorge Nuñez MD  Department of Hospital Medicine  [facility] - Intensive Care

## 2024-04-07 NOTE — HOSPITAL COURSE
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  Repeat VBG with worsening hypercapnia.  On BiPAP 15/8 30% FiO2, on IV Solu-Medrol 40 mg b.i.d. and duo nebs continue home Keppra.  Patient is clinically improving.  Pulmonology consulted given worsening hypercapnia. Improved resp status On 2L of O2 with NC wean as tolerated patient doesn't have insurance to cover for his inhalers, CM consulted to assist with medicaid and Rx at discharge.  Patient weaned off of oxygen and feeling much better without shortness a breath or wheezing.  States that he is feeling ready to discharge home.  Able to ambulate without difficulty.  Patient sent with Rx for a Z-Mau, steroid taper, and inhalers.  These were sent to our pharmacy to be brought up to the patient.  He did have an insurance card which was used to get the inhalers.  Discharge instructions given at bedside with patient endorsing understanding and all questions answered prior to discharge home.  Patient sent with referral for pulmonology and advised to follow up with his PCP within a week.

## 2024-04-07 NOTE — SUBJECTIVE & OBJECTIVE
Interval History:  Patient reports shortness of breath.  On BiPAP support.  Appears comfortable.  No cough/fever/chest pain/nausea/vomiting    Review of Systems   Constitutional: Negative.    Respiratory:  Positive for shortness of breath and wheezing. Negative for cough.    Cardiovascular: Negative.    Gastrointestinal: Negative.    Genitourinary: Negative.    Musculoskeletal: Negative.    Neurological: Negative.      Objective:     Vital Signs (Most Recent):  Temp: 97.9 °F (36.6 °C) (04/07/24 0701)  Pulse: 83 (04/07/24 1000)  Resp: 17 (04/07/24 1000)  BP: 134/65 (04/07/24 1000)  SpO2: 100 % (04/07/24 1000) Vital Signs (24h Range):  Temp:  [96.9 °F (36.1 °C)-98.6 °F (37 °C)] 97.9 °F (36.6 °C)  Pulse:  [] 83  Resp:  [14-30] 17  SpO2:  [30 %-100 %] 100 %  BP: (121-213)/() 134/65     Weight: 85.3 kg (188 lb 0.8 oz)  Body mass index is 24.14 kg/m².    Intake/Output Summary (Last 24 hours) at 4/7/2024 1029  Last data filed at 4/7/2024 0400  Gross per 24 hour   Intake 1049 ml   Output 700 ml   Net 349 ml         Physical Exam  Constitutional:       General: He is not in acute distress.     Appearance: He is normal weight.   Cardiovascular:      Rate and Rhythm: Normal rate.      Pulses: Normal pulses.      Heart sounds: No murmur heard.  Pulmonary:      Effort: No respiratory distress.      Breath sounds: Wheezing (On BiPAP support) present.   Abdominal:      General: Bowel sounds are normal. There is no distension.      Palpations: Abdomen is soft.      Tenderness: There is no abdominal tenderness.   Musculoskeletal:      Right lower leg: No edema.      Left lower leg: No edema.   Skin:     General: Skin is warm.   Neurological:      Mental Status: He is alert and oriented to person, place, and time. Mental status is at baseline.   Psychiatric:         Mood and Affect: Mood normal.             Significant Labs: All pertinent labs within the past 24 hours have been reviewed.    Significant Imaging: I have  reviewed all pertinent imaging results/findings within the past 24 hours.

## 2024-04-07 NOTE — ASSESSMENT & PLAN NOTE
Patient with Hypercapnic and Hypoxic Respiratory failure which is Acute.  he is not on home oxygen. Supplemental oxygen was provided and noted- Oxygen Concentration (%):  [30-60] 30    .   Signs/symptoms of respiratory failure include- increased work of breathing and respiratory distress. Contributing diagnoses includes -  asthma  Labs and images were reviewed. Patient Has recent ABG, which has been reviewed. Will treat underlying causes and adjust management of respiratory failure as follows- Wean BiPAP as tolerated, continue treatment for asthma.

## 2024-04-07 NOTE — ASSESSMENT & PLAN NOTE
Patient with Hypercapnic Respiratory failure which is Acute on chronic.  he is not on home oxygen. Supplemental oxygen was provided and noted- Oxygen Concentration (%):  [30-60] 30    .   Signs/symptoms of respiratory failure include- tachypnea, respiratory distress, and wheezing. Contributing diagnoses includes -  asthma exacerbation  Labs and images were reviewed. Patient Has recent ABG, which has been reviewed. Will treat underlying causes and adjust management of respiratory failure as follows-   Abg c/w acute on chronic hypercapnic respiratory failure.  Unclear of etiology for progressive escalation of PCO2 during hospitalization.  Patient is pull good tidal volume ~500 cc or more while on bipap.  Will increase ipap from 15 to 18 for more PS.  Will repeat abg.    Continue with steroid + nebs.   Subjectively feeling better.  Clinically stable.

## 2024-04-07 NOTE — PROGRESS NOTES
[facility] Medicine  Progress Note    Patient Name: Raymond Duarte  MRN: [id number]  Patient Class: IP- Inpatient   Admission Date: 4/6/2024  Length of Stay: 0 days  Attending Physician: Kenna Kraft,*  Primary Care Provider: [facility] -        Subjective:     Principal Problem:Asthma exacerbation        HPI:  This is a 24-year-old male with a past medical history of asthma, seizure disorder who presents with shortness of breath.     Patient presents with sudden-onset shortness of breaths that started on the day of presentation.  He reports onset of dyspnea while doing physical work, and possibly being exposed to fumes which typically triggers asthma.  He was compliant with his inhalers at home.  Additional symptoms include chest tightness associated with his dyspnea.    In the ED, the patient was tachycardic (120s), tachypneic, hypertensive and was placed on BiPAP. Labs showed leukocytosis (19.4). VBG showed a PH of 7.250 > 7.268, PCO2 of 70.4 > 60.3. Chest X-ray showed no acute findings. Patient was given 1L of NS, Solumedrol 125 mg IV, magnesium sulfate 2 g IV, Duo-Nebs x2. He was admitted for further management.     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  Repeat VBG with worsening hypercapnia.  On BiPAP 15/8 30% FiO2, on IV Solu-Medrol 40 mg b.i.d. and duo nebs continue home Keppra.  Patient is clinically improving.  Pulmonology consulted given worsening hypercapnia.  Tidal volume 500 cc    Interval History:  Patient reports shortness of breath.  On BiPAP support.  Appears comfortable.  No cough/fever/chest pain/nausea/vomiting    Review of Systems   Constitutional: Negative.    Respiratory:  Positive for shortness of breath and wheezing. Negative for cough.     Cardiovascular: Negative.    Gastrointestinal: Negative.    Genitourinary: Negative.    Musculoskeletal: Negative.    Neurological: Negative.      Objective:     Vital Signs (Most Recent):  Temp: 97.9 °F (36.6 °C) (04/07/24 0701)  Pulse: 83 (04/07/24 1000)  Resp: 17 (04/07/24 1000)  BP: 134/65 (04/07/24 1000)  SpO2: 100 % (04/07/24 1000) Vital Signs (24h Range):  Temp:  [96.9 °F (36.1 °C)-98.6 °F (37 °C)] 97.9 °F (36.6 °C)  Pulse:  [] 83  Resp:  [14-30] 17  SpO2:  [30 %-100 %] 100 %  BP: (121-213)/() 134/65     Weight: 85.3 kg (188 lb 0.8 oz)  Body mass index is 24.14 kg/m².    Intake/Output Summary (Last 24 hours) at 4/7/2024 1029  Last data filed at 4/7/2024 0400  Gross per 24 hour   Intake 1049 ml   Output 700 ml   Net 349 ml         Physical Exam  Constitutional:       General: He is not in acute distress.     Appearance: He is normal weight.   Cardiovascular:      Rate and Rhythm: Normal rate.      Pulses: Normal pulses.      Heart sounds: No murmur heard.  Pulmonary:      Effort: No respiratory distress.      Breath sounds: Wheezing (On BiPAP support) present.   Abdominal:      General: Bowel sounds are normal. There is no distension.      Palpations: Abdomen is soft.      Tenderness: There is no abdominal tenderness.   Musculoskeletal:      Right lower leg: No edema.      Left lower leg: No edema.   Skin:     General: Skin is warm.   Neurological:      Mental Status: He is alert and oriented to person, place, and time. Mental status is at baseline.   Psychiatric:         Mood and Affect: Mood normal.             Significant Labs: All pertinent labs within the past 24 hours have been reviewed.    Significant Imaging: I have reviewed all pertinent imaging results/findings within the past 24 hours.    Assessment/Plan:      * Asthma exacerbation  Continue Solu-Medrol, scheduled Duo-Nebs  Wean BiPAP as tolerated  Pulmonology on board given worsening hypercapnia  COVID negative in ED chest x-ray  negative  Optimize inhaler on discharge       Seizure disorder  Continue Keppra      Acute respiratory failure with hypoxia and hypercapnia  Patient with Hypercapnic and Hypoxic Respiratory failure which is Acute.  he is not on home oxygen. Supplemental oxygen was provided and noted- Oxygen Concentration (%):  [30-60] 30    .   Signs/symptoms of respiratory failure include- increased work of breathing and respiratory distress. Contributing diagnoses includes -  asthma  Labs and images were reviewed. Patient Has recent ABG, which has been reviewed. Will treat underlying causes and adjust management of respiratory failure as follows- Wean BiPAP as tolerated, continue treatment for asthma.       VTE Risk Mitigation (From admission, onward)           Ordered     enoxaparin injection 40 mg  Daily         04/07/24 0246     IP VTE HIGH RISK PATIENT  Once         04/07/24 0246     Place sequential compression device  Until discontinued         04/07/24 0246                    Discharge Planning   TRACIE: 4/10/2024     Code Status: Full Code   Is the patient medically ready for discharge?:     Reason for patient still in hospital (select all that apply): Patient trending condition and Treatment  Discharge Plan A: Home with family (Follow-up)            Critical care time spent on the evaluation and treatment of severe organ dysfunction, review of pertinent labs and imaging studies, discussions with consulting providers and discussions with patient/family: 35 minutes.      Kenna Kraft MD  Department of Hospital Medicine   [facility] - Intensive Care

## 2024-04-08 PROBLEM — D72.829 LEUKOCYTOSIS: Status: ACTIVE | Noted: 2024-04-08

## 2024-04-08 PROBLEM — E87.20 LACTIC ACIDOSIS: Status: ACTIVE | Noted: 2024-04-08

## 2024-04-08 PROBLEM — E87.20 LACTIC ACIDOSIS: Status: RESOLVED | Noted: 2024-04-08 | Resolved: 2024-04-08

## 2024-04-08 LAB
ANION GAP SERPL CALC-SCNC: 6 MMOL/L (ref 8–16)
BASOPHILS # BLD AUTO: 0.01 K/UL (ref 0–0.2)
BASOPHILS NFR BLD: 0.1 % (ref 0–1.9)
BUN SERPL-MCNC: 13 MG/DL (ref 6–20)
CALCIUM SERPL-MCNC: 9.7 MG/DL (ref 8.7–10.5)
CHLORIDE SERPL-SCNC: 104 MMOL/L (ref 95–110)
CO2 SERPL-SCNC: 26 MMOL/L (ref 23–29)
CREAT SERPL-MCNC: 0.9 MG/DL (ref 0.5–1.4)
DIFFERENTIAL METHOD BLD: ABNORMAL
EOSINOPHIL # BLD AUTO: 0 K/UL (ref 0–0.5)
EOSINOPHIL NFR BLD: 0 % (ref 0–8)
ERYTHROCYTE [DISTWIDTH] IN BLOOD BY AUTOMATED COUNT: 11.4 % (ref 11.5–14.5)
EST. GFR  (NO RACE VARIABLE): >60 ML/MIN/1.73 M^2
GLUCOSE SERPL-MCNC: 204 MG/DL (ref 70–110)
HCT VFR BLD AUTO: 41.9 % (ref 40–54)
HGB BLD-MCNC: 13.3 G/DL (ref 14–18)
IMM GRANULOCYTES # BLD AUTO: 0.08 K/UL (ref 0–0.04)
IMM GRANULOCYTES NFR BLD AUTO: 0.5 % (ref 0–0.5)
LYMPHOCYTES # BLD AUTO: 1.1 K/UL (ref 1–4.8)
LYMPHOCYTES NFR BLD: 7.4 % (ref 18–48)
MCH RBC QN AUTO: 28.7 PG (ref 27–31)
MCHC RBC AUTO-ENTMCNC: 31.7 G/DL (ref 32–36)
MCV RBC AUTO: 90 FL (ref 82–98)
MONOCYTES # BLD AUTO: 0.7 K/UL (ref 0.3–1)
MONOCYTES NFR BLD: 4.6 % (ref 4–15)
NEUTROPHILS # BLD AUTO: 13.2 K/UL (ref 1.8–7.7)
NEUTROPHILS NFR BLD: 87.4 % (ref 38–73)
NRBC BLD-RTO: 0 /100 WBC
OHS QRS DURATION: 80 MS
OHS QTC CALCULATION: 453 MS
PLATELET # BLD AUTO: 281 K/UL (ref 150–450)
PMV BLD AUTO: 9.2 FL (ref 9.2–12.9)
POTASSIUM SERPL-SCNC: 4.5 MMOL/L (ref 3.5–5.1)
RBC # BLD AUTO: 4.64 M/UL (ref 4.6–6.2)
SODIUM SERPL-SCNC: 136 MMOL/L (ref 136–145)
WBC # BLD AUTO: 15.06 K/UL (ref 3.9–12.7)

## 2024-04-08 PROCEDURE — 94640 AIRWAY INHALATION TREATMENT: CPT

## 2024-04-08 PROCEDURE — 25000003 PHARM REV CODE 250: Performed by: STUDENT IN AN ORGANIZED HEALTH CARE EDUCATION/TRAINING PROGRAM

## 2024-04-08 PROCEDURE — 36415 COLL VENOUS BLD VENIPUNCTURE: CPT | Performed by: STUDENT IN AN ORGANIZED HEALTH CARE EDUCATION/TRAINING PROGRAM

## 2024-04-08 PROCEDURE — 27000221 HC OXYGEN, UP TO 24 HOURS

## 2024-04-08 PROCEDURE — 99233 SBSQ HOSP IP/OBS HIGH 50: CPT | Mod: ,,, | Performed by: INTERNAL MEDICINE

## 2024-04-08 PROCEDURE — 11000001 HC ACUTE MED/SURG PRIVATE ROOM

## 2024-04-08 PROCEDURE — 25000242 PHARM REV CODE 250 ALT 637 W/ HCPCS: Performed by: STUDENT IN AN ORGANIZED HEALTH CARE EDUCATION/TRAINING PROGRAM

## 2024-04-08 PROCEDURE — 63600175 PHARM REV CODE 636 W HCPCS: Performed by: INTERNAL MEDICINE

## 2024-04-08 PROCEDURE — 80048 BASIC METABOLIC PNL TOTAL CA: CPT | Performed by: STUDENT IN AN ORGANIZED HEALTH CARE EDUCATION/TRAINING PROGRAM

## 2024-04-08 PROCEDURE — 94761 N-INVAS EAR/PLS OXIMETRY MLT: CPT

## 2024-04-08 PROCEDURE — 63600175 PHARM REV CODE 636 W HCPCS: Performed by: STUDENT IN AN ORGANIZED HEALTH CARE EDUCATION/TRAINING PROGRAM

## 2024-04-08 PROCEDURE — 85025 COMPLETE CBC W/AUTO DIFF WBC: CPT | Performed by: STUDENT IN AN ORGANIZED HEALTH CARE EDUCATION/TRAINING PROGRAM

## 2024-04-08 RX ORDER — BUDESONIDE 0.5 MG/2ML
1 INHALANT ORAL 2 TIMES DAILY
Status: DISCONTINUED | OUTPATIENT
Start: 2024-04-08 | End: 2024-04-09 | Stop reason: HOSPADM

## 2024-04-08 RX ORDER — ALBUTEROL SULFATE 2.5 MG/.5ML
2.5 SOLUTION RESPIRATORY (INHALATION) EVERY 4 HOURS PRN
Status: DISCONTINUED | OUTPATIENT
Start: 2024-04-08 | End: 2024-04-09 | Stop reason: HOSPADM

## 2024-04-08 RX ORDER — FLUTICASONE FUROATE AND VILANTEROL 200; 25 UG/1; UG/1
1 POWDER RESPIRATORY (INHALATION) DAILY
Status: DISCONTINUED | OUTPATIENT
Start: 2024-04-08 | End: 2024-04-08 | Stop reason: CLARIF

## 2024-04-08 RX ORDER — ARFORMOTEROL TARTRATE 15 UG/2ML
15 SOLUTION RESPIRATORY (INHALATION) 2 TIMES DAILY
Status: DISCONTINUED | OUTPATIENT
Start: 2024-04-08 | End: 2024-04-09 | Stop reason: HOSPADM

## 2024-04-08 RX ORDER — FAMOTIDINE 20 MG/1
40 TABLET, FILM COATED ORAL DAILY
Status: DISCONTINUED | OUTPATIENT
Start: 2024-04-08 | End: 2024-04-09 | Stop reason: HOSPADM

## 2024-04-08 RX ADMIN — IPRATROPIUM BROMIDE AND ALBUTEROL SULFATE 3 ML: 2.5; .5 SOLUTION RESPIRATORY (INHALATION) at 12:04

## 2024-04-08 RX ADMIN — METHYLPREDNISOLONE SODIUM SUCCINATE 40 MG: 40 INJECTION, POWDER, FOR SOLUTION INTRAMUSCULAR; INTRAVENOUS at 02:04

## 2024-04-08 RX ADMIN — ENOXAPARIN SODIUM 40 MG: 40 INJECTION SUBCUTANEOUS at 04:04

## 2024-04-08 RX ADMIN — ARFORMOTEROL TARTRATE 15 MCG: 15 SOLUTION RESPIRATORY (INHALATION) at 08:04

## 2024-04-08 RX ADMIN — IPRATROPIUM BROMIDE AND ALBUTEROL SULFATE 3 ML: 2.5; .5 SOLUTION RESPIRATORY (INHALATION) at 08:04

## 2024-04-08 RX ADMIN — MUPIROCIN: 20 OINTMENT TOPICAL at 08:04

## 2024-04-08 RX ADMIN — FAMOTIDINE 20 MG: 10 INJECTION INTRAVENOUS at 09:04

## 2024-04-08 RX ADMIN — METHYLPREDNISOLONE SODIUM SUCCINATE 40 MG: 40 INJECTION, POWDER, FOR SOLUTION INTRAMUSCULAR; INTRAVENOUS at 06:04

## 2024-04-08 RX ADMIN — IPRATROPIUM BROMIDE AND ALBUTEROL SULFATE 3 ML: 2.5; .5 SOLUTION RESPIRATORY (INHALATION) at 03:04

## 2024-04-08 RX ADMIN — LEVETIRACETAM 750 MG: 100 SOLUTION ORAL at 08:04

## 2024-04-08 RX ADMIN — BUDESONIDE 1 MG: 0.5 INHALANT RESPIRATORY (INHALATION) at 08:04

## 2024-04-08 RX ADMIN — MUPIROCIN: 20 OINTMENT TOPICAL at 09:04

## 2024-04-08 NOTE — NURSING
[facility]  Nurses Note -- 4 Eyes      4/8/2024       Skin assessed on: Q Shift      [x] No Pressure Injuries Present    [x]Prevention Measures Documented    [] Yes LDA  for Pressure Injury Previously documented     [] Yes New Pressure Injury Discovered   [] LDA for New Pressure Injury Added      Attending RN:  GEOVANY TSAI RN     Second RN:  BONNIE Riggins

## 2024-04-08 NOTE — NURSING
"[facility] - Intensive Care  ICU Shift Summary  Date: 4/8/2024      Prehospitalization: Home  Admit Date / LOS : 4/6/2024/ 1 days    Diagnosis: Asthma exacerbation    Consults:        Active: Pulm CC       Needed: N/A     Code Status: Full Code   Advanced Directive: <no information>    LDA:  Lines/Drains/Airways       Peripheral Intravenous Line  Duration                  Peripheral IV - Single Lumen 04/06/24 2050 18 G Left Antecubital 1 day         Peripheral IV - Single Lumen 04/07/24 0125 18 G Posterior;Right Forearm 1 day                  Central Lines/Site/Justification:Patient Does Not Have Central Line  Urinary Cath/Order/Justification:Patient Does Not Have Urinary Catheter    Vasopressors/Infusions:        GOALS: Volume/ Hemodynamic: N/A                     RASS: N/A    Pain Management: none       Pain Controlled: not applicable     Rhythm: NSR and ST    Respiratory Device: Nasal Cannula    Oxygen Concentration (%):  [30] 30                 Most Recent SBT/ SAT: N/A       MOVE Screen: PASS  ICU Liberation: yes    VTE Prophylaxis: Mechanical and Ambulation  Mobility: Ambulatory  Stress Ulcer Prophylaxis: Yes    Isolation: No active isolations    Dietary:   Current Diet Order   Procedures    Diet Adult Regular (IDDSI Level 7)      Tolerance: yes  Advancement: @ goal    I & O (24h):    Intake/Output Summary (Last 24 hours) at 4/8/2024 0620  Last data filed at 4/8/2024 0542  Gross per 24 hour   Intake 500 ml   Output 775 ml   Net -275 ml        Restraints: No    Significant Dates:  Post Op Date: N/A  Rescue Date: N/A  Imaging/ Diagnostics: N/A    Noteworthy Labs:  WBC 15.06    COVID Test: (--)  CBC/Anemia Labs: Coags:    Recent Labs   Lab 04/07/24 0528 04/08/24 0439   WBC 10.76 15.06*   HGB 14.8 13.3*   HCT 45.9 41.9    281   MCV 89 90   RDW 11.4* 11.4*    No results for input(s): "PT", "INR", "APTT" in the last 168 hours.     Chemistries:   Recent Labs   Lab 04/07/24 0528 04/08/24 0439    136 " "  K 4.6 4.5    104   CO2 23 26   BUN 9 13   CREATININE 1.0 0.9   CALCIUM 9.8 9.7   PROT 7.5  --    BILITOT 0.9  --    ALKPHOS 103  --    ALT 15  --    AST 23  --    MG 2.1  --    PHOS 2.8  --         Cardiac Enzymes: Ejection Fractions:    No results for input(s): "CPK", "CPKMB", "MB", "TROPONINI" in the last 72 hours. No results found for: "EF"     POCT Glucose: HbA1c:    No results for input(s): "POCTGLUCOSE" in the last 168 hours. No results found for: "HGBA1C"        ICU LOS 1d 3h  Level of Care: OK to Transfer    Chart Check: 12 HR Done  Shift Summary/Plan for the shift: none  "

## 2024-04-08 NOTE — SUBJECTIVE & OBJECTIVE
Interval History: SOB is improving. On 2L of O2 with NC. Wean as tolerated not in resp distress    Review of Systems   Constitutional: Negative.    Respiratory: Negative.     Cardiovascular: Negative.    Gastrointestinal: Negative.    Genitourinary: Negative.      Objective:     Vital Signs (Most Recent):  Temp: 97.9 °F (36.6 °C) (04/08/24 0800)  Pulse: 100 (04/08/24 0915)  Resp: (!) 22 (04/08/24 0915)  BP: 117/62 (04/08/24 0900)  SpO2: 99 % (04/08/24 0915) Vital Signs (24h Range):  Temp:  [97.8 °F (36.6 °C)-98 °F (36.7 °C)] 97.9 °F (36.6 °C)  Pulse:  [] 100  Resp:  [15-34] 22  SpO2:  [94 %-100 %] 99 %  BP: (117-155)/(54-77) 117/62     Weight: 85.3 kg (188 lb 0.8 oz)  Body mass index is 24.14 kg/m².    Intake/Output Summary (Last 24 hours) at 4/8/2024 1018  Last data filed at 4/8/2024 0542  Gross per 24 hour   Intake 500 ml   Output 775 ml   Net -275 ml         Physical Exam  Constitutional:       General: He is not in acute distress.     Appearance: He is normal weight.   Cardiovascular:      Rate and Rhythm: Normal rate.      Pulses: Normal pulses.      Heart sounds: No murmur heard.  Pulmonary:      Effort: No respiratory distress (on 2l of O2 with NC).      Breath sounds: Normal breath sounds. No wheezing.   Abdominal:      General: Bowel sounds are normal. There is no distension.      Palpations: Abdomen is soft.      Tenderness: There is no abdominal tenderness.   Musculoskeletal:      Right lower leg: No edema.      Left lower leg: No edema.   Skin:     General: Skin is warm.   Neurological:      Mental Status: He is alert and oriented to person, place, and time. Mental status is at baseline.   Psychiatric:         Mood and Affect: Mood normal.             Significant Labs: I have reviewed all pertinent imaging results/findings within the past 24 hours.    Significant Imaging: I have reviewed all pertinent imaging results/findings within the past 24 hours.

## 2024-04-08 NOTE — SUBJECTIVE & OBJECTIVE
Interval History: Patient denies shortness of breath and is back to his baseline. Denies any new exposures. Denies smoking or any smoke exposure. He lost insurance and lost prescription coverage and has not seen a physician in a while. Ran out of his albuterol. Also has a nebulizer at home but no medications.        Objective:     Vital Signs (Most Recent):  Temp: 98 °F (36.7 °C) (04/08/24 1200)  Pulse: 88 (04/08/24 1215)  Resp: 15 (04/08/24 1215)  BP: 138/78 (04/08/24 1200)  SpO2: 98 % (04/08/24 1215) Vital Signs (24h Range):  Temp:  [97.8 °F (36.6 °C)-98 °F (36.7 °C)] 98 °F (36.7 °C)  Pulse:  [] 88  Resp:  [15-34] 15  SpO2:  [94 %-100 %] 98 %  BP: (117-155)/(54-78) 138/78     Weight: 85.3 kg (188 lb 0.8 oz)  Body mass index is 24.14 kg/m².      Intake/Output Summary (Last 24 hours) at 4/8/2024 1232  Last data filed at 4/8/2024 0542  Gross per 24 hour   Intake 500 ml   Output 375 ml   Net 125 ml        Physical Exam  Vitals reviewed.   Constitutional:       Appearance: Normal appearance. He is normal weight.   HENT:      Head: Normocephalic and atraumatic.   Eyes:      Extraocular Movements: Extraocular movements intact.      Pupils: Pupils are equal, round, and reactive to light.   Cardiovascular:      Rate and Rhythm: Normal rate and regular rhythm.   Pulmonary:      Effort: Pulmonary effort is normal.      Breath sounds: Normal breath sounds. No wheezing.   Abdominal:      General: Abdomen is flat.      Palpations: Abdomen is soft.   Musculoskeletal:         General: No swelling.   Skin:     General: Skin is warm and dry.      Capillary Refill: Capillary refill takes less than 2 seconds.   Neurological:      General: No focal deficit present.      Mental Status: He is alert and oriented to person, place, and time. Mental status is at baseline.   Psychiatric:      Comments: Blunted affect and very disinterested           Review of Systems    Vents:  Oxygen Concentration (%): 30 (04/07/24  1538)    Lines/Drains/Airways       Peripheral Intravenous Line  Duration                  Peripheral IV - Single Lumen 04/06/24 2050 18 G Left Antecubital 1 day         Peripheral IV - Single Lumen 04/07/24 0125 18 G Posterior;Right Forearm 1 day                    Significant Labs:    CBC/Anemia Profile:  Recent Labs   Lab 04/07/24 0528 04/08/24 0439   WBC 10.76 15.06*   HGB 14.8 13.3*   HCT 45.9 41.9    281   MCV 89 90   RDW 11.4* 11.4*        Chemistries:  Recent Labs   Lab 04/07/24 0528 04/08/24 0439    136   K 4.6 4.5    104   CO2 23 26   BUN 9 13   CREATININE 1.0 0.9   CALCIUM 9.8 9.7   ALBUMIN 4.4  --    PROT 7.5  --    BILITOT 0.9  --    ALKPHOS 103  --    ALT 15  --    AST 23  --    MG 2.1  --    PHOS 2.8  --        ABGs:   Recent Labs   Lab 04/07/24  1753   PH 7.384   PCO2 43.8   HCO3 26.1   POCSATURATED 99   BE 1     All pertinent labs within the past 24 hours have been reviewed.    Significant Imaging:  I have reviewed all pertinent imaging results/findings within the past 24 hours.  CXR: I have reviewed all pertinent results/findings within the past 24 hours and my personal findings are:  normal lung herrera  Tely shows NSR

## 2024-04-08 NOTE — PLAN OF CARE
Pt remains in the ICU on 2L NC.  NSR on the monitor.  Afebrile.  Aox4.  Pt up to bathroom, tolerated well.  Transfer orders received.  Plan of care reviewed.  No injuries, falls or skin breakdown occurred this shift.

## 2024-04-08 NOTE — ASSESSMENT & PLAN NOTE
Very atypical with asthma. He denies any other exposures. He is back to his baseline. If all from his underlying asthma, to present with hypoxia and CO2 retention is associated with high risk of asthma related death. Discussed critical importance of follow up and using inhaler medications.

## 2024-04-08 NOTE — PROGRESS NOTES
[facility] Medicine  Progress Note    Patient Name: Raymond Duarte  MRN: [id number]  Patient Class: IP- Inpatient   Admission Date: 4/6/2024  Length of Stay: 1 days  Attending Physician: Kenna Kraft,*  Primary Care Provider: [facility] -        Subjective:     Principal Problem:Asthma exacerbation        HPI:  This is a 24-year-old male with a past medical history of asthma, seizure disorder who presents with shortness of breath.     Patient presents with sudden-onset shortness of breaths that started on the day of presentation.  He reports onset of dyspnea while doing physical work, and possibly being exposed to fumes which typically triggers asthma.  He was compliant with his inhalers at home.  Additional symptoms include chest tightness associated with his dyspnea.    In the ED, the patient was tachycardic (120s), tachypneic, hypertensive and was placed on BiPAP. Labs showed leukocytosis (19.4). VBG showed a PH of 7.250 > 7.268, PCO2 of 70.4 > 60.3. Chest X-ray showed no acute findings. Patient was given 1L of NS, Solumedrol 125 mg IV, magnesium sulfate 2 g IV, Duo-Nebs x2. He was admitted for further management.     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  Repeat VBG with worsening hypercapnia.  On BiPAP 15/8 30% FiO2, on IV Solu-Medrol 40 mg b.i.d. and duo nebs continue home Keppra.  Patient is clinically improving.  Pulmonology consulted given worsening hypercapnia. Improved resp status On 2L of O2 with NC wean as tolerated patient doesn't have insurance to cover for his inhalers, CM consulted to assist with medicaid and Rx at discharge.     No new subjective & objective note has been filed under this hospital service since the last note was  generated.      Assessment/Plan:      * Asthma exacerbation  Continue Solu-Medrol, scheduled Duo-Nebs  Wean BiPAP as tolerated  Pulmonology on board given worsening hypercapnia  COVID negative in ED chest x-ray negative   patient doesn't have insurance to cover for his inhalers, CM consulted to assist with medicaid and Rx for inhalers at discharge.   Pulm rec discharge with whatever medication is best covered between Symbicort, Advair, Wixella, Breo.     Leukocytosis  Likely reactive to steroid use no evidence of active infection no fever      Seizure disorder  Continue Keppra      Acute respiratory failure with hypoxia and hypercapnia  Patient with Hypercapnic and Hypoxic Respiratory failure which is Acute.  he is not on home oxygen. Supplemental oxygen was provided and noted- Oxygen Concentration (%):  [30] 30    .   Signs/symptoms of respiratory failure include- increased work of breathing and respiratory distress. Contributing diagnoses includes -  asthma  Labs and images were reviewed. Patient Has recent ABG, which has been reviewed. Will treat underlying causes and adjust management of respiratory failure as follows- Weaned off BiPAP support on 2L of O2 with NC      VTE Risk Mitigation (From admission, onward)           Ordered     enoxaparin injection 40 mg  Daily         04/07/24 0246     IP VTE HIGH RISK PATIENT  Once         04/07/24 0246     Place sequential compression device  Until discontinued         04/07/24 0246                    Discharge Planning   TRACIE: 4/10/2024     Code Status: Full Code   Is the patient medically ready for discharge?:     Reason for patient still in hospital (select all that apply): Patient trending condition and Treatment  Discharge Plan A: Home with family (Follow-up)            Critical care time spent on the evaluation and treatment of severe organ dysfunction, review of pertinent labs and imaging studies, discussions with consulting providers and discussions with  patient/family: 45 minutes.      Kenna Kraft MD  Department of Hospital Medicine   [facility] [place] - Intensive Care

## 2024-04-08 NOTE — PROGRESS NOTES
[place] - Intensive Care  Pulmonology  Progress Note    Patient Name: Raymond Duarte  MRN: [id number]  Admission Date: 4/6/2024  Hospital Length of Stay: 1 days  Code Status: Full Code  Attending Provider: Kenna Kraft,*  Primary Care Provider: [facility] -   Principal Problem: Asthma exacerbation    Subjective:     Interval History: Patient denies shortness of breath and is back to his baseline. Denies any new exposures. Denies smoking or any smoke exposure. He lost insurance and lost prescription coverage and has not seen a physician in a while. Ran out of his albuterol. Also has a nebulizer at home but no medications.        Objective:     Vital Signs (Most Recent):  Temp: 98 °F (36.7 °C) (04/08/24 1200)  Pulse: 88 (04/08/24 1215)  Resp: 15 (04/08/24 1215)  BP: 138/78 (04/08/24 1200)  SpO2: 98 % (04/08/24 1215) Vital Signs (24h Range):  Temp:  [97.8 °F (36.6 °C)-98 °F (36.7 °C)] 98 °F (36.7 °C)  Pulse:  [] 88  Resp:  [15-34] 15  SpO2:  [94 %-100 %] 98 %  BP: (117-155)/(54-78) 138/78     Weight: 85.3 kg (188 lb 0.8 oz)  Body mass index is 24.14 kg/m².      Intake/Output Summary (Last 24 hours) at 4/8/2024 1232  Last data filed at 4/8/2024 0542  Gross per 24 hour   Intake 500 ml   Output 375 ml   Net 125 ml        Physical Exam  Vitals reviewed.   Constitutional:       Appearance: Normal appearance. He is normal weight.   HENT:      Head: Normocephalic and atraumatic.   Eyes:      Extraocular Movements: Extraocular movements intact.      Pupils: Pupils are equal, round, and reactive to light.   Cardiovascular:      Rate and Rhythm: Normal rate and regular rhythm.   Pulmonary:      Effort: Pulmonary effort is normal.      Breath sounds: Normal breath sounds. No wheezing.   Abdominal:      General: Abdomen is flat.      Palpations: Abdomen is soft.   Musculoskeletal:         General: No swelling.   Skin:     General: Skin is warm and dry.      Capillary Refill: Capillary refill takes  less than 2 seconds.   Neurological:      General: No focal deficit present.      Mental Status: He is alert and oriented to person, place, and time. Mental status is at baseline.   Psychiatric:      Comments: Blunted affect and very disinterested           Review of Systems    Vents:  Oxygen Concentration (%): 30 (04/07/24 1538)    Lines/Drains/Airways       Peripheral Intravenous Line  Duration                  Peripheral IV - Single Lumen 04/06/24 2050 18 G Left Antecubital 1 day         Peripheral IV - Single Lumen 04/07/24 0125 18 G Posterior;Right Forearm 1 day                    Significant Labs:    CBC/Anemia Profile:  Recent Labs   Lab 04/07/24 0528 04/08/24  0439   WBC 10.76 15.06*   HGB 14.8 13.3*   HCT 45.9 41.9    281   MCV 89 90   RDW 11.4* 11.4*        Chemistries:  Recent Labs   Lab 04/07/24 0528 04/08/24 0439    136   K 4.6 4.5    104   CO2 23 26   BUN 9 13   CREATININE 1.0 0.9   CALCIUM 9.8 9.7   ALBUMIN 4.4  --    PROT 7.5  --    BILITOT 0.9  --    ALKPHOS 103  --    ALT 15  --    AST 23  --    MG 2.1  --    PHOS 2.8  --        ABGs:   Recent Labs   Lab 04/07/24  1753   PH 7.384   PCO2 43.8   HCO3 26.1   POCSATURATED 99   BE 1     All pertinent labs within the past 24 hours have been reviewed.    Significant Imaging:  I have reviewed all pertinent imaging results/findings within the past 24 hours.  CXR: I have reviewed all pertinent results/findings within the past 24 hours and my personal findings are:  normal lung herrera  Tely shows NSR    ABG  Recent Labs   Lab 04/07/24  1753   PH 7.384   PO2 148*   PCO2 43.8   HCO3 26.1   BE 1     Assessment/Plan:     Neuro  Seizure disorder  Noted comorbidity. No evidence of seizure involved in his presentation.    Pulmonary  * Asthma exacerbation  Continue Steroids and can reduce to 60 Pred daily. Plan for 2 week outpatient taper.   PRN albuterol nebs. No role for DuoNebs  Start ICS/LABA and discharge with whatever medication is best  covered between Symbicort, Advair, Flaco Pringle.      Acute respiratory failure with hypoxia and hypercapnia  Very atypical with asthma. He denies any other exposures. He is back to his baseline. If all from his underlying asthma, to present with hypoxia and CO2 retention is associated with high risk of asthma related death. Discussed critical importance of follow up and using inhaler medications.    Renal/  Lactic acidosis  Side effect of albuterol nebulizers. Noted      Discussed case in detail with hospitalist. Discussed difficulty of access to care with patient and medication coverage options and assistance programs.     Aubrey Reed MD  Pulmonology  [facility] [place] - Intensive Care

## 2024-04-08 NOTE — ASSESSMENT & PLAN NOTE
Continue Steroids and can reduce to 60 Pred daily. Plan for 2 week outpatient taper.   PRN albuterol nebs. No role for DuoNebs  Start ICS/LABA and discharge with whatever medication is best covered between Symbicort, Advair, Wixella, Breo.

## 2024-04-08 NOTE — PROVIDER TRANSFER
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 Repeat VBG with worsening hypercapnia. On BiPAP 15/8 30% FiO2, on IV Solu-Medrol 40 mg b.i.d. and duo nebs continue home Keppra. Patient is clinically improving. Pulmonology consulted given worsening hypercapnia. Improved resp status overnight.  On 2L of O2 with NC wean as tolerated patient doesn't have insurance to cover for his inhalers,     Change to po steroids in am  CM consulted to assist with medicaid and Rx at discharge. Can assist with inhalers at discharge  Pulm rec discharge with whatever medication is best covered between Symbicort, Advair, Wixella, Breo.   Likely d/c in am

## 2024-04-08 NOTE — ASSESSMENT & PLAN NOTE
Continue Solu-Medrol, scheduled Duo-Nebs  Wean BiPAP as tolerated  Pulmonology on board given worsening hypercapnia  COVID negative in ED chest x-ray negative   patient doesn't have insurance to cover for his inhalers, CM consulted to assist with medicaid and Rx for inhalers at discharge.   Pulm rec discharge with whatever medication is best covered between Symbicort, Advair, Wixella, Breo.

## 2024-04-08 NOTE — ASSESSMENT & PLAN NOTE
Patient with Hypercapnic and Hypoxic Respiratory failure which is Acute.  he is not on home oxygen. Supplemental oxygen was provided and noted- Oxygen Concentration (%):  [30] 30    .   Signs/symptoms of respiratory failure include- increased work of breathing and respiratory distress. Contributing diagnoses includes -  asthma  Labs and images were reviewed. Patient Has recent ABG, which has been reviewed. Will treat underlying causes and adjust management of respiratory failure as follows- Weaned off BiPAP support on 2L of O2 with NC

## 2024-04-09 VITALS
OXYGEN SATURATION: 98 % | TEMPERATURE: 98 F | BODY MASS INDEX: 23.93 KG/M2 | WEIGHT: 186.5 LBS | SYSTOLIC BLOOD PRESSURE: 123 MMHG | HEART RATE: 75 BPM | HEIGHT: 74 IN | DIASTOLIC BLOOD PRESSURE: 77 MMHG | RESPIRATION RATE: 16 BRPM

## 2024-04-09 PROCEDURE — 63600175 PHARM REV CODE 636 W HCPCS: Performed by: STUDENT IN AN ORGANIZED HEALTH CARE EDUCATION/TRAINING PROGRAM

## 2024-04-09 PROCEDURE — 25000003 PHARM REV CODE 250: Performed by: STUDENT IN AN ORGANIZED HEALTH CARE EDUCATION/TRAINING PROGRAM

## 2024-04-09 PROCEDURE — 94640 AIRWAY INHALATION TREATMENT: CPT

## 2024-04-09 PROCEDURE — 94761 N-INVAS EAR/PLS OXIMETRY MLT: CPT

## 2024-04-09 PROCEDURE — 99900031 HC PATIENT EDUCATION (STAT)

## 2024-04-09 PROCEDURE — 25000242 PHARM REV CODE 250 ALT 637 W/ HCPCS: Performed by: STUDENT IN AN ORGANIZED HEALTH CARE EDUCATION/TRAINING PROGRAM

## 2024-04-09 RX ORDER — PREDNISONE 10 MG/1
TABLET ORAL
Qty: 30 TABLET | Refills: 0 | Status: SHIPPED | OUTPATIENT
Start: 2024-04-09 | End: 2024-04-21

## 2024-04-09 RX ORDER — ALBUTEROL SULFATE 90 UG/1
2 AEROSOL, METERED RESPIRATORY (INHALATION) EVERY 6 HOURS PRN
Qty: 8.5 G | Refills: 0 | Status: SHIPPED | OUTPATIENT
Start: 2024-04-09 | End: 2025-04-09

## 2024-04-09 RX ORDER — ALBUTEROL SULFATE 0.83 MG/ML
2.5 SOLUTION RESPIRATORY (INHALATION) EVERY 4 HOURS PRN
Qty: 90 ML | Refills: 0 | Status: SHIPPED | OUTPATIENT
Start: 2024-04-09 | End: 2024-04-09

## 2024-04-09 RX ORDER — ALBUTEROL SULFATE 0.83 MG/ML
2.5 SOLUTION RESPIRATORY (INHALATION) EVERY 4 HOURS PRN
Qty: 90 ML | Refills: 0 | Status: SHIPPED | OUTPATIENT
Start: 2024-04-09 | End: 2025-04-09

## 2024-04-09 RX ORDER — PREDNISONE 10 MG/1
TABLET ORAL
Qty: 30 TABLET | Refills: 0 | Status: SHIPPED | OUTPATIENT
Start: 2024-04-09 | End: 2024-04-09

## 2024-04-09 RX ORDER — AZITHROMYCIN 250 MG/1
TABLET, FILM COATED ORAL
Qty: 6 TABLET | Refills: 0 | Status: SHIPPED | OUTPATIENT
Start: 2024-04-09 | End: 2024-04-09

## 2024-04-09 RX ORDER — AZITHROMYCIN 250 MG/1
TABLET, FILM COATED ORAL
Qty: 6 TABLET | Refills: 0 | Status: SHIPPED | OUTPATIENT
Start: 2024-04-09 | End: 2024-04-14

## 2024-04-09 RX ADMIN — ARFORMOTEROL TARTRATE 15 MCG: 15 SOLUTION RESPIRATORY (INHALATION) at 07:04

## 2024-04-09 RX ADMIN — BUDESONIDE 1 MG: 0.5 INHALANT RESPIRATORY (INHALATION) at 07:04

## 2024-04-09 RX ADMIN — METHYLPREDNISOLONE SODIUM SUCCINATE 40 MG: 40 INJECTION, POWDER, FOR SOLUTION INTRAMUSCULAR; INTRAVENOUS at 01:04

## 2024-04-09 RX ADMIN — MUPIROCIN: 20 OINTMENT TOPICAL at 09:04

## 2024-04-09 NOTE — PLAN OF CARE
Case Management Final Discharge Note    Discharge Disposition: Home    Relevant SDOH / Transition of Care Barriers:  Uninsured    Primary Caretaker and contact information: Kae [phone]    Scheduled followup appointment: Pt will follow up with [facility]    Transportation: Pt's family will provide transportation home when discharged.    Patient and family educated on discharge services and updated on DC plan. Bedside RN notified, patient clear to discharge from Case Management Perspective.       04/09/24 1031   Final Note   Assessment Type Final Discharge Note   Anticipated Discharge Disposition Home   What phone number can be called within the next 1-3 days to see how you are doing after discharge? [phone]   Hospital Resources/Appts/Education Provided Appointments scheduled and added to AVS   Post-Acute Status   Coverage Uninsured   Discharge Delays None known at this time

## 2024-04-09 NOTE — PLAN OF CARE
Problem: Adult Inpatient Plan of Care  Goal: Plan of Care Review  Outcome: Adequate for Care Transition  Goal: Patient-Specific Goal (Individualized)  Outcome: Adequate for Care Transition  Goal: Absence of Hospital-Acquired Illness or Injury  Outcome: Adequate for Care Transition  Goal: Optimal Comfort and Wellbeing  Outcome: Adequate for Care Transition  Goal: Readiness for Transition of Care  Outcome: Adequate for Care Transition     Problem: Asthma Exacerbation  Goal: Asthma Symptom Relief  Outcome: Adequate for Care Transition     Problem: Breathing Pattern Ineffective  Goal: Effective Breathing Pattern  Outcome: Adequate for Care Transition

## 2024-04-09 NOTE — PLAN OF CARE
Problem: Adult Inpatient Plan of Care  Goal: Plan of Care Review  Outcome: Ongoing, Progressing  Goal: Patient-Specific Goal (Individualized)  Outcome: Ongoing, Progressing  Goal: Absence of Hospital-Acquired Illness or Injury  Outcome: Ongoing, Progressing  Goal: Optimal Comfort and Wellbeing  Outcome: Ongoing, Progressing     Problem: Asthma Exacerbation  Goal: Asthma Symptom Relief  Outcome: Ongoing, Progressing     Problem: Breathing Pattern Ineffective  Goal: Effective Breathing Pattern  Outcome: Ongoing, Progressing     Patient is AAOx4. On room air. Tele maintained. No falls or injuries reported during shift, safety precautions maintained.

## 2024-04-09 NOTE — PLAN OF CARE
Neuro: AO X 4    Cardiac: SR     Respiratory: NC @ 2 LPM    GI: Regular    : Urinal     Lines: PIV X 2    GTT: NONE    Bed in lowest position, bed locked, and call light placed near pt. Free from fall and injury. Care explained.

## 2024-04-09 NOTE — NURSING
Patient came to the unit via wheelchair from ICU accompanied by a Nurse,  on room air and no apparent distress noted. Oriented on his room and got situated on  bed. Put bed in lowest position, with HOB elevated. Side rails raised, instructed to call for assistance.Vital signs taken. Four eyes completed.

## 2024-04-09 NOTE — NURSING
[facility] [place]  Nurses Note -- 4 Eyes      4/9/2024       Skin assessed on: Q Shift      [x] No Pressure Injuries Present    [x]Prevention Measures Documented    [] Yes LDA  for Pressure Injury Previously documented     [] Yes New Pressure Injury Discovered   [] LDA for New Pressure Injury Added      Attending RN:  Alice Dahl RN     Second RN:  Reji WILSON RN

## 2024-04-09 NOTE — NURSING
[facility] [place]  Nurses Note -- 4 Eyes      4/8/2024       Skin assessed on: Transfer      [x] No Pressure Injuries Present    [x]Prevention Measures Documented    [] Yes LDA  for Pressure Injury Previously documented     [] Yes New Pressure Injury Discovered   [] LDA for New Pressure Injury Added      Attending RN:  Reji Meza RN     Second RN:  YUNIOR Arambula

## 2024-04-09 NOTE — PLAN OF CARE
CM contacted registration because pt stated he had insurance. Pt provided [place] [employer] card. Registration stated pt is ineligible.     CM informed pt. Pt stated he is employed. CM explained he may have been over the income limit to have Medicaid.

## 2024-04-10 NOTE — DISCHARGE SUMMARY
[facility] Medicine  Discharge Summary      Patient Name: Raymond Duarte  MRN: [id number]  [facility]: [id number]  Patient Class: IP- Inpatient  Admission Date: 4/6/2024  Hospital Length of Stay: 2 days  Discharge Date and Time: 4/9/2024  3:00 PM  Attending Physician: No att. providers found   Discharging Provider: Nando San III, MD  Primary Care Provider: [facility] -    Primary Care Team: Networked reference to record PCT     HPI:   This is a 24-year-old male with a past medical history of asthma, seizure disorder who presents with shortness of breath.     Patient presents with sudden-onset shortness of breaths that started on the day of presentation.  He reports onset of dyspnea while doing physical work, and possibly being exposed to fumes which typically triggers asthma.  He was compliant with his inhalers at home.  Additional symptoms include chest tightness associated with his dyspnea.    In the ED, the patient was tachycardic (120s), tachypneic, hypertensive and was placed on BiPAP. Labs showed leukocytosis (19.4). VBG showed a PH of 7.250 > 7.268, PCO2 of 70.4 > 60.3. Chest X-ray showed no acute findings. Patient was given 1L of NS, Solumedrol 125 mg IV, magnesium sulfate 2 g IV, Duo-Nebs x2. He was admitted for further management.     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  Repeat VBG with worsening hypercapnia.  On BiPAP 15/8 30% FiO2, on IV Solu-Medrol 40 mg b.i.d. and duo nebs continue home Keppra.  Patient is clinically improving.  Pulmonology consulted given worsening hypercapnia. Improved resp status On 2L of O2 with NC wean as tolerated patient doesn't have insurance to cover for his inhalers, CM consulted to assist with medicaid and Rx at discharge.   Patient weaned off of oxygen and feeling much better without shortness a breath or wheezing.  States that he is feeling ready to discharge home.  Able to ambulate without difficulty.  Patient sent with Rx for a Z-Mau, steroid taper, and inhalers.  These were sent to our pharmacy to be brought up to the patient.  He did have an insurance card which was used to get the inhalers.  Discharge instructions given at bedside with patient endorsing understanding and all questions answered prior to discharge home.  Patient sent with referral for pulmonology and advised to follow up with his PCP within a week.     Goals of Care Treatment Preferences:  Code Status: Full Code      Consults:   Consults (From admission, onward)          Status Ordering Provider     Inpatient consult to Social Work  Once        Provider:  (Not yet assigned)    Completed TARUN FERRARA     Inpatient consult to Pulmonology  Once        Provider:  Joan Majano MD    Completed TARUN FERRARA            Oncology  Leukocytosis  Likely reactive to steroid use no evidence of active infection no fever        Final Active Diagnoses:    Diagnosis Date Noted POA    PRINCIPAL PROBLEM:  Asthma exacerbation [J45.901] 07/17/2021 Yes    Leukocytosis [D72.829] 04/08/2024 Yes    Acute respiratory failure with hypoxia and hypercapnia [J96.01, J96.02] 04/07/2024 Yes    Seizure disorder [G40.909] 04/07/2024 Yes      Problems Resolved During this Admission:    Diagnosis Date Noted Date Resolved POA    Lactic acidosis [E87.20] 04/08/2024 04/08/2024 Yes       Discharged Condition: good    Disposition: Home or Self Care    Follow Up:   Follow-up Information       [facility] -. Schedule an appointment as soon as possible for a visit in 1 week(s).    Why: [facility] Follow-up and to establish ongoing medical care.    Pt will need to follow up with pulmonology.  Contact information:  [address] [place]  [address]  [phone]                           Patient Instructions:      Ambulatory referral/consult to Pulmonology   Standing Status: Future   Referral Priority: Routine Referral Type: Consultation   Referral Reason: Specialty Services Required   Requested Specialty: Pulmonary Disease   Number of Visits Requested: 1     Diet Cardiac     Notify your health care provider if you experience any of the following:  increased confusion or weakness     Notify your health care provider if you experience any of the following:  persistent dizziness, light-headedness, or visual disturbances     Notify your health care provider if you experience any of the following:  worsening rash     Notify your health care provider if you experience any of the following:  severe persistent headache     Notify your health care provider if you experience any of the following:  difficulty breathing or increased cough     Notify your health care provider if you experience any of the following:  redness, tenderness, or signs of infection (pain, swelling, redness, odor or green/yellow discharge around incision site)     Notify your health care provider if you experience any of the following:  severe uncontrolled pain     Notify your health care provider if you experience any of the following:  persistent nausea and vomiting or diarrhea     Notify your health care provider if you experience any of the following:  temperature >100.4     Activity as tolerated       Significant Diagnostic Studies: Labs: All labs within the past 24 hours have been reviewed    Pending Diagnostic Studies:       None           Medications:  Reconciled Home Medications:      Medication List        START taking these medications      azithromycin 250 MG tablet  Commonly known as: Z-KAYLEIGH  Take 2 tablets by mouth on day 1; Take 1 tablet by mouth on days 2-5     predniSONE 10 MG tablet  Commonly known as: DELTASONE  Take 4 tablets (40 mg total) by mouth once daily for 3 days,  THEN 3 tablets (30 mg total) once daily for 3 days, THEN 2 tablets (20 mg total) once daily for 3 days, THEN 1 tablet (10 mg total) once daily for 3 days.  Start taking on: April 9, 2024            CHANGE how you take these medications      * albuterol 90 mcg/actuation inhaler  Commonly known as: PROVENTIL/VENTOLIN HFA  Inhale 2 puffs into the lungs every 6 (six) hours as needed for Wheezing. Use with spacer.  What changed:   additional instructions  Another medication with the same name was removed. Continue taking this medication, and follow the directions you see here.     * albuterol 2.5 mg /3 mL (0.083 %) nebulizer solution  Commonly known as: PROVENTIL  Take 3 mLs (2.5 mg total) by nebulization every 4 (four) hours as needed (As needed for shortness of breath).  What changed: You were already taking a medication with the same name, and this prescription was added. Make sure you understand how and when to take each.  Replaces: albuterol sulfate 2.5 mg/0.5 mL Nebu     * albuterol 90 mcg/actuation inhaler  Commonly known as: PROVENTIL/VENTOLIN HFA  Inhale 2 puffs into the lungs every 6 (six) hours as needed for Wheezing. Use with spacer. Dispense with 1 spacer  What changed: You were already taking a medication with the same name, and this prescription was added. Make sure you understand how and when to take each.           * This list has 3 medication(s) that are the same as other medications prescribed for you. Read the directions carefully, and ask your doctor or other care provider to review them with you.                CONTINUE taking these medications      cetirizine 10 MG tablet  Commonly known as: ZYRTEC  Take 10 mg by mouth daily as needed for Allergies.     FLUoxetine 40 MG capsule  Take 40 mg by mouth once daily.     levetiracetam  mg Tb24 tablet  Commonly known as: KEPPRA XR  Take 1 tablet (750 mg total) by mouth Daily.     loratadine 10 mg tablet  Commonly known as: CLARITIN  Take 1 tablet (10  mg total) by mouth once daily.            STOP taking these medications      albuterol sulfate 2.5 mg/0.5 mL Nebu  Replaced by: albuterol 2.5 mg /3 mL (0.083 %) nebulizer solution  You also have another medication with the same name that you need to continue taking as instructed.              Indwelling Lines/Drains at time of discharge:   Lines/Drains/Airways       None                   Time spent on the discharge of patient: 40 minutes         Nando San III, MD  Department of [facility] Medicine  [facility]

## 2024-07-03 ENCOUNTER — OFFICE VISIT (OUTPATIENT)
Dept: URGENT CARE | Facility: CLINIC | Age: 24
End: 2024-07-03

## 2024-07-03 VITALS
SYSTOLIC BLOOD PRESSURE: 143 MMHG | TEMPERATURE: 98 F | WEIGHT: 186 LBS | OXYGEN SATURATION: 98 % | DIASTOLIC BLOOD PRESSURE: 93 MMHG | RESPIRATION RATE: 16 BRPM | HEIGHT: 74 IN | HEART RATE: 91 BPM | BODY MASS INDEX: 23.87 KG/M2

## 2024-07-03 DIAGNOSIS — J45.901 EXACERBATION OF ASTHMA, UNSPECIFIED ASTHMA SEVERITY, UNSPECIFIED WHETHER PERSISTENT: Primary | ICD-10-CM

## 2024-07-03 RX ORDER — ALBUTEROL SULFATE 0.83 MG/ML
2.5 SOLUTION RESPIRATORY (INHALATION)
Status: COMPLETED | OUTPATIENT
Start: 2024-07-03 | End: 2024-07-03

## 2024-07-03 RX ORDER — PREDNISONE 20 MG/1
40 TABLET ORAL DAILY
Qty: 10 TABLET | Refills: 0 | Status: SHIPPED | OUTPATIENT
Start: 2024-07-03 | End: 2024-07-03

## 2024-07-03 RX ORDER — DEXAMETHASONE SODIUM PHOSPHATE 100 MG/10ML
10 INJECTION INTRAMUSCULAR; INTRAVENOUS
Status: COMPLETED | OUTPATIENT
Start: 2024-07-03 | End: 2024-07-03

## 2024-07-03 RX ORDER — IPRATROPIUM BROMIDE 0.5 MG/2.5ML
0.5 SOLUTION RESPIRATORY (INHALATION)
Status: COMPLETED | OUTPATIENT
Start: 2024-07-03 | End: 2024-07-03

## 2024-07-03 RX ORDER — PREDNISONE 20 MG/1
40 TABLET ORAL DAILY
Qty: 10 TABLET | Refills: 0 | Status: SHIPPED | OUTPATIENT
Start: 2024-07-03 | End: 2024-07-08

## 2024-07-03 RX ORDER — ALBUTEROL SULFATE 90 UG/1
2 AEROSOL, METERED RESPIRATORY (INHALATION) EVERY 6 HOURS PRN
Qty: 18 G | Refills: 0 | Status: SHIPPED | OUTPATIENT
Start: 2024-07-03 | End: 2025-07-03

## 2024-07-03 RX ORDER — LEVETIRACETAM 750 MG/1
750 TABLET ORAL 2 TIMES DAILY
COMMUNITY

## 2024-07-03 RX ORDER — ALBUTEROL SULFATE 90 UG/1
2 AEROSOL, METERED RESPIRATORY (INHALATION) EVERY 6 HOURS PRN
Qty: 18 G | Refills: 0 | Status: SHIPPED | OUTPATIENT
Start: 2024-07-03 | End: 2024-07-03

## 2024-07-03 RX ADMIN — ALBUTEROL SULFATE 2.5 MG: 0.83 SOLUTION RESPIRATORY (INHALATION) at 03:07

## 2024-07-03 RX ADMIN — DEXAMETHASONE SODIUM PHOSPHATE 10 MG: 100 INJECTION INTRAMUSCULAR; INTRAVENOUS at 03:07

## 2024-07-03 RX ADMIN — IPRATROPIUM BROMIDE 0.5 MG: 0.5 SOLUTION RESPIRATORY (INHALATION) at 03:07

## 2024-07-08 PROBLEM — J96.02 ACUTE RESPIRATORY FAILURE WITH HYPOXIA AND HYPERCAPNIA: Status: RESOLVED | Noted: 2024-04-07 | Resolved: 2024-07-08

## 2024-07-08 PROBLEM — J96.01 ACUTE RESPIRATORY FAILURE WITH HYPOXIA AND HYPERCAPNIA: Status: RESOLVED | Noted: 2024-04-07 | Resolved: 2024-07-08

## 2024-09-08 ENCOUNTER — HOSPITAL ENCOUNTER (EMERGENCY)
Facility: HOSPITAL | Age: 24
Discharge: HOME | End: 2024-09-08
Attending: STUDENT IN AN ORGANIZED HEALTH CARE EDUCATION/TRAINING PROGRAM
Payer: COMMERCIAL

## 2024-09-08 VITALS
TEMPERATURE: 98 F | DIASTOLIC BLOOD PRESSURE: 90 MMHG | HEART RATE: 80 BPM | WEIGHT: 180 LBS | OXYGEN SATURATION: 96 % | SYSTOLIC BLOOD PRESSURE: 151 MMHG | BODY MASS INDEX: 23.1 KG/M2 | RESPIRATION RATE: 18 BRPM | HEIGHT: 74 IN

## 2024-09-08 DIAGNOSIS — S61.309A AVULSION OF FINGERNAIL, INITIAL ENCOUNTER: ICD-10-CM

## 2024-09-08 DIAGNOSIS — M79.645 FINGER PAIN, LEFT: Primary | ICD-10-CM

## 2024-09-08 PROCEDURE — 99283 EMERGENCY DEPT VISIT LOW MDM: CPT | Mod: 25,ER

## 2024-09-08 RX ORDER — SULFAMETHOXAZOLE AND TRIMETHOPRIM 800; 160 MG/1; MG/1
1 TABLET ORAL 2 TIMES DAILY
Qty: 10 TABLET | Refills: 0 | Status: SHIPPED | OUTPATIENT
Start: 2024-09-08 | End: 2024-09-13

## 2024-09-08 RX ORDER — ACETAMINOPHEN 500 MG
500 TABLET ORAL EVERY 6 HOURS PRN
Qty: 20 TABLET | Refills: 0 | Status: SHIPPED | OUTPATIENT
Start: 2024-09-08

## 2024-09-09 NOTE — DISCHARGE INSTRUCTIONS
Thank you for coming to our Emergency Department today. It is important to remember that some problems are difficult to diagnose and may not be found during your first visit. Be sure to follow up with your primary care doctor and review any labs/imaging that was performed with them. If you do not have a primary care doctor, you may contact the one listed on your discharge paperwork or you may also call the [facility] Appointment Desk at [phone] to schedule an appointment with one.     All medications may potentially have side effects and it is impossible to predict which medications may give you side effects. If you feel that you are having a negative effect of any medication you should immediately stop taking them and seek medical attention.    Return to the ER with any questions/concerns, new/concerning symptoms, worsening or failure to improve. Do not drive or make any important decisions for 24 hours if you have received any pain medications, sedatives or mood altering drugs during your ER visit.

## 2024-09-09 NOTE — ED NOTES
Pt presents stating he has a spaghetti noodle stuck in his 3rd finger left hand. Pt states he was at wiping a surface and the noodle went in his finger through his nail. Pt states he attempted to remove the noodle by cutting away at the finger nail but was unsuccessful. Pt reports incident happened around 3pm today. Pt reports he is UTD on tetanus.

## 2024-09-09 NOTE — ED PROVIDER NOTES
Encounter Date: 9/8/2024       History     Chief Complaint   Patient presents with    Foreign Body     Pt states he was cleaning at work and a piece of dry spaghetti got stuck in his left middle finger.      24 y.o. male who has a past medical history of Asthma and Seizures. presents to the emergency department due to  pain to left 3rd possible foreign body.  Patient reports he was at work cleaning when that has began possibly went under his fingernail.  Patient reports cutting the now trying to remove it began having significant pain so came to emergency department for evaluation he reports pain is sharp in nature and starts finger and radiates up his hand.  Reports being up-to-date on his immunizations    The history is provided by the patient.     Review of patient's allergies indicates:  No Known Allergies  Past Medical History:   Diagnosis Date    Asthma     Seizures      No past surgical history on file.  No family history on file.  Social History     Tobacco Use    Smoking status: Never    Smokeless tobacco: Never   Substance Use Topics    Alcohol use: Never    Drug use: Never     Review of Systems   Constitutional:  Negative for chills and fever.   Musculoskeletal:  Positive for arthralgias.   Neurological:  Negative for numbness.       Physical Exam     Initial Vitals [09/08/24 2036]   BP Pulse Resp Temp SpO2   (!) 151/90 80 18 97.8 °F (36.6 °C) 96 %      MAP       --         Physical Exam    Musculoskeletal:        Hands:      Skin: Capillary refill takes less than 2 seconds.       ED Course   Procedures  Labs Reviewed - No data to display       Imaging Results              X-Ray Finger 2 or More Views Left (Final result)  Result time 09/08/24 21:07:33      Final result by Javid Olsen MD (09/08/24 21:07:33)                   Impression:      No acute displaced fracture-dislocation or radiopaque foreign body identified.      Electronically signed by: Javid Olsen MD  Date:    09/08/2024  Time:    21:07                Narrative:    EXAMINATION:  XR FINGER 2 OR MORE VIEWS LEFT    CLINICAL HISTORY:  possible foreign body in 3rd finger;    TECHNIQUE:  Three views    COMPARISON:  None    FINDINGS:  Bones are well mineralized. Overall alignment is within normal limits. No displaced fracture, dislocation or destructive osseous process.  Joint spaces appear relatively maintained. No subcutaneous emphysema or radiopaque foreign body.                                       Medications - No data to display  Medical Decision Making:   Initial Assessment:   24 y.o. male who has a past medical history of Asthma and Seizures. presents to the emergency department due to  pain to left 3rd possible foreign body.  Patient in no acute distress.  Exam with erythema.  Patient able to range DIP and PIP cap refill intact.  Although I have low suspicion for foreign body x-ray was obtained that did not show any abnormalities.  I would have precaution patient will be discharged with p.o. antibiotics.  Wound care instructions given to patient.  Differential Diagnosis:   Differential Diagnosis includes, but is not limited to:  foreign body, abrasion, soft tissue contusion, osteoarthritis   Clinical Tests:   Radiological Study: Ordered and Reviewed             ED Course as of 09/08/24 2241   Sun Sep 08, 2024   2110 X-Ray Finger 2 or More Views Left [AS]   2111 Pt is currently stable for discharge. I see no indication of an emergent process beyond that addressed during our encounter but have duly counseled the patient/family regarding the need for prompt follow-up as well as the indications that should prompt immediate return to the emergency room should new or worrisome developments occur. I discussed the ED work up and diagnostic findings with the patient/family. The patient/family has been provided with verbal and printed direction regarding our final diagnosis(es) as well as instructions regarding use of OTC and/or Rx medications intended to  manage the patient's aforementioned conditions. The patient/family verbalized an understanding. The patient/family is asked if there are any questions or concerns. We discuss the case, until all issues are addressed to the patient/family's satisfaction. Patient/family understands and is agreeable to the plan.  [AS]      ED Course User Index  [AS] Odin Lucio MD          Medical Decision Making  Amount and/or Complexity of Data Reviewed  Radiology: ordered. Decision-making details documented in ED Course.    Risk  OTC drugs.  Prescription drug management.           Clinical Impression:   Final diagnoses:  [M79.645] Finger pain, left (Primary)  [S61.309A] Avulsion of fingernail, initial encounter          ED Disposition Condition    Discharge Stable          ED Prescriptions       Medication Sig Dispense Start Date End Date Auth. Provider    sulfamethoxazole-trimethoprim 800-160mg (BACTRIM DS) 800-160 mg Tab Take 1 tablet by mouth 2 (two) times daily. for 5 days 10 tablet 9/8/2024 9/13/2024 Odin Lucio MD    acetaminophen (TYLENOL) 500 MG tablet Take 1 tablet (500 mg total) by mouth every 6 (six) hours as needed for Pain. 20 tablet 9/8/2024 -- Odin Lucio MD          Follow-up Information       Follow up With Specialties Details Why Contact [facility] -  Schedule an appointment as soon as possible for a visit  for reassesment, For wound re-check [address] [place] [address] [phone]      [facility] ED Emergency Medicine  If symptoms worsen [address] [facility] [address] [phone]            DISCLAIMER: This note was prepared with [employer] voice recognition transcription software. Garbled syntax, mangled pronouns, and other bizarre constructions may be attributed to that software system.     Odin Lucio MD  09/08/24 6323

## 2024-11-27 ENCOUNTER — PATIENT MESSAGE (OUTPATIENT)
Dept: RESEARCH | Facility: HOSPITAL | Age: 24
End: 2024-11-27
Payer: COMMERCIAL